# Patient Record
Sex: MALE | Race: WHITE | NOT HISPANIC OR LATINO | Employment: FULL TIME | URBAN - METROPOLITAN AREA
[De-identification: names, ages, dates, MRNs, and addresses within clinical notes are randomized per-mention and may not be internally consistent; named-entity substitution may affect disease eponyms.]

---

## 2019-08-12 ENCOUNTER — OFFICE VISIT (OUTPATIENT)
Dept: FAMILY MEDICINE | Facility: CLINIC | Age: 64
End: 2019-08-12
Payer: COMMERCIAL

## 2019-08-12 VITALS
DIASTOLIC BLOOD PRESSURE: 86 MMHG | WEIGHT: 226 LBS | OXYGEN SATURATION: 96 % | BODY MASS INDEX: 32.35 KG/M2 | SYSTOLIC BLOOD PRESSURE: 134 MMHG | HEART RATE: 65 BPM | HEIGHT: 70 IN | TEMPERATURE: 98 F | RESPIRATION RATE: 20 BRPM

## 2019-08-12 DIAGNOSIS — Z91.89 AT RISK FOR CORONARY ARTERY DISEASE: ICD-10-CM

## 2019-08-12 DIAGNOSIS — Z12.11 SCREENING FOR MALIGNANT NEOPLASM OF COLON: ICD-10-CM

## 2019-08-12 DIAGNOSIS — Z12.5 SCREENING FOR MALIGNANT NEOPLASM OF PROSTATE: ICD-10-CM

## 2019-08-12 DIAGNOSIS — F10.10 ETOH ABUSE: ICD-10-CM

## 2019-08-12 DIAGNOSIS — Z82.49 FAMILY HISTORY OF CORONARY ARTERIOSCLEROSIS: ICD-10-CM

## 2019-08-12 DIAGNOSIS — Z00.00 WELLNESS EXAMINATION: Primary | ICD-10-CM

## 2019-08-12 DIAGNOSIS — F17.200 TOBACCO DEPENDENCE: ICD-10-CM

## 2019-08-12 PROCEDURE — 99386 PR PREVENTIVE VISIT,NEW,40-64: ICD-10-PCS | Mod: 25,S$GLB,, | Performed by: FAMILY MEDICINE

## 2019-08-12 PROCEDURE — 93000 ELECTROCARDIOGRAM COMPLETE: CPT | Mod: S$GLB,,, | Performed by: FAMILY MEDICINE

## 2019-08-12 PROCEDURE — 99386 PREV VISIT NEW AGE 40-64: CPT | Mod: 25,S$GLB,, | Performed by: FAMILY MEDICINE

## 2019-08-12 PROCEDURE — 93000 PR ELECTROCARDIOGRAM, COMPLETE: ICD-10-PCS | Mod: S$GLB,,, | Performed by: FAMILY MEDICINE

## 2019-08-12 NOTE — PROGRESS NOTES
Subjective:      Patient ID: Ousmane Vega is a 64 y.o. male.    Chief Complaint: Establish Care (strong family hx of heart problems )      HPI:  64-year-old white male past medical history of knee osteoarthritis who presents for initiation care.  He is without complaints today.  He denies any shortness of breath or chest pain. He does have some concern as he has a strong family history of coronary artery disease.  His father mother and multiple siblings with coronary artery disease in their 60s.  He is active.  He can walk up 2 flights of stairs without chest pain. He does use tobacco once a day.  He does drink beer on a daily basis.  Normally drinks more than 2 beers.  No family history of colon or prostate cancer.    Past Medical History:   Diagnosis Date    Arthritis      Past Surgical History:   Procedure Laterality Date    HAND SURGERY      LEG SURGERY      plate to right lower leg from fx      Family History   Problem Relation Age of Onset    Heart disease Mother     Hyperlipidemia Mother     Hypertension Mother     Heart disease Father     Hyperlipidemia Father     Hypertension Father     Hyperlipidemia Sister     Heart attack Brother     Hypertension Brother     Heart attack Maternal Grandfather     Alzheimer's disease Paternal Grandfather      Social History     Socioeconomic History    Marital status:      Spouse name: Not on file    Number of children: Not on file    Years of education: Not on file    Highest education level: Not on file   Occupational History    Not on file   Social Needs    Financial resource strain: Not on file    Food insecurity:     Worry: Not on file     Inability: Not on file    Transportation needs:     Medical: Not on file     Non-medical: Not on file   Tobacco Use    Smoking status: Never Smoker    Smokeless tobacco: Current User     Types: Snuff   Substance and Sexual Activity    Alcohol use: Yes     Alcohol/week: 2.4 oz     Types: 4 Cans of  beer per week     Frequency: 2-3 times a week    Drug use: Never    Sexual activity: Not on file   Lifestyle    Physical activity:     Days per week: Not on file     Minutes per session: Not on file    Stress: Not on file   Relationships    Social connections:     Talks on phone: Not on file     Gets together: Not on file     Attends Anabaptist service: Not on file     Active member of club or organization: Not on file     Attends meetings of clubs or organizations: Not on file     Relationship status: Not on file   Other Topics Concern    Not on file   Social History Narrative    Not on file     Review of patient's allergies indicates:  No Known Allergies    Review of Systems   Constitutional: Negative for activity change, appetite change, chills, fatigue, fever and unexpected weight change.   HENT: Negative for congestion, ear pain, rhinorrhea, sinus pressure, sinus pain, sneezing, sore throat and trouble swallowing.    Eyes: Negative for photophobia, pain and itching.   Respiratory: Negative for cough, chest tightness, shortness of breath and wheezing.    Cardiovascular: Negative for chest pain, palpitations and leg swelling.   Gastrointestinal: Negative for abdominal distention, abdominal pain, constipation, diarrhea, nausea and vomiting.   Endocrine: Negative for cold intolerance, heat intolerance, polydipsia and polyphagia.   Genitourinary: Negative for difficulty urinating, dysuria and frequency.   Musculoskeletal: Negative for arthralgias, joint swelling and myalgias.   Skin: Negative for pallor and rash.   Neurological: Negative for dizziness, seizures, syncope, speech difficulty and headaches.   Hematological: Negative for adenopathy. Does not bruise/bleed easily.   Psychiatric/Behavioral: Negative for agitation, behavioral problems and hallucinations.       Objective:       /86 (BP Location: Left arm, Patient Position: Sitting, BP Method: Large (Manual))   Pulse 65   Temp 97.7 °F (36.5 °C)  "(Oral)   Resp 20   Ht 5' 10" (1.778 m)   Wt 102.5 kg (226 lb)   SpO2 96%   BMI 32.43 kg/m²   Physical Exam   Constitutional: He is oriented to person, place, and time. He appears well-developed and well-nourished.   HENT:   Head: Normocephalic and atraumatic.   Nose: Nose normal.   Mouth/Throat: Oropharynx is clear and moist.   Eyes: Pupils are equal, round, and reactive to light. Conjunctivae and EOM are normal.   Neck: Normal range of motion. Neck supple.   Cardiovascular: Normal rate, regular rhythm, normal heart sounds and intact distal pulses.   Pulmonary/Chest: Effort normal and breath sounds normal.   Abdominal: Soft. There is no tenderness.   Musculoskeletal: Normal range of motion.   Neurological: He is alert and oriented to person, place, and time.   Skin: Skin is warm and dry.   Psychiatric: He has a normal mood and affect. His behavior is normal. Judgment and thought content normal.   Nursing note and vitals reviewed.      Assessment:     1. Wellness examination    2. Tobacco dependence    3. ETOH abuse    4. Family history of coronary arteriosclerosis    5. Screening for malignant neoplasm of prostate    6. Screening for malignant neoplasm of colon    7. At risk for coronary artery disease        Plan:   Wellness examination  -     CBC auto differential; Future; Expected date: 08/12/2019  -     Comprehensive metabolic panel; Future; Expected date: 08/12/2019  -     Lipid panel; Future; Expected date: 08/12/2019    Tobacco dependence    ETOH abuse    Family history of coronary arteriosclerosis  -     EKG 12-lead    Screening for malignant neoplasm of prostate  -     PSA, Screening; Future; Expected date: 08/12/2019    Screening for malignant neoplasm of colon    At risk for coronary artery disease  -     EKG 12-lead  -     CT Calcium Scoring Cardiac; Future; Expected date: 08/12/2019      Discussed colon cancer screening with patient again on follow-up phone call.    Order CT calcium score.  Patient " is asymptomatic but has moderate risk.  Safe ethanol usage and avoidance of tobacco discussed.  Diet and exercise.    PSA ordered.    Will come in for fasting labs.

## 2019-08-29 ENCOUNTER — CLINICAL SUPPORT (OUTPATIENT)
Dept: FAMILY MEDICINE | Facility: CLINIC | Age: 64
End: 2019-08-29
Payer: COMMERCIAL

## 2019-08-29 DIAGNOSIS — R93.1 AGATSTON CORONARY ARTERY CALCIUM SCORE GREATER THAN 400: Primary | ICD-10-CM

## 2019-08-29 DIAGNOSIS — K57.92 DIVERTICULITIS: ICD-10-CM

## 2019-08-29 NOTE — PROGRESS NOTES
Discussed Calcium score.     Order PET scan.    Recently had diverticulitis.  Started on antibiotics by ER.  Will need colonoscopy.  Will order this after PET scan obtained.  Denies any chest pain shortness of breath.    Plan to start cholesterol medicine once labs results.

## 2019-08-30 DIAGNOSIS — R73.9 HYPERGLYCEMIA: Primary | ICD-10-CM

## 2019-08-30 LAB
ABS NRBC COUNT: 0 X 10 3/UL (ref 0–0.01)
ABSOLUTE BASOPHIL: 0.08 X 10 3/UL (ref 0–0.22)
ABSOLUTE EOSINOPHIL: 0.24 X 10 3/UL (ref 0.04–0.54)
ABSOLUTE IMMATURE GRAN: 0.02 X 10 3/UL (ref 0–0.04)
ABSOLUTE LYMPHOCYTE: 2.13 X 10 3/UL (ref 0.86–4.75)
ABSOLUTE MONOCYTE: 0.51 X 10 3/UL (ref 0.22–1.08)
ALBUMIN SERPL-MCNC: 4.3 G/DL (ref 3.5–5.2)
ALBUMIN/GLOB SERPL ELPH: 1.7 {RATIO} (ref 1–2.7)
ALP ISOS SERPL LEV INH-CCNC: 39 IU/L (ref 40–130)
ALT (SGPT): 50 U/L (ref 0–41)
ANION GAP SERPL CALC-SCNC: 14 MMOL/L (ref 8–17)
AST SERPL-CCNC: 27 U/L (ref 0–40)
BASOPHILS NFR BLD: 1.1 %
BILIRUBIN, TOTAL: 0.45 MG/DL (ref 0–1.2)
BUN/CREAT SERPL: 16.2 (ref 6–20)
CALCIUM SERPL-MCNC: 9.6 MG/DL (ref 8.6–10.2)
CARBON DIOXIDE, CO2: 23 MMOL/L (ref 22–29)
CHLORIDE: 107 MMOL/L (ref 98–107)
CHOLEST SERPL-MSCNC: 232 MG/DL (ref 100–200)
CREAT SERPL-MCNC: 0.77 MG/DL (ref 0.7–1.2)
EOSINOPHIL NFR BLD: 3.4 %
GFR ESTIMATION: 102.37
GLOBULIN: 2.6 G/DL (ref 1.5–4.5)
GLUCOSE: 164 MG/DL (ref 82–115)
HCT VFR BLD AUTO: 46.5 % (ref 42–52)
HDLC SERPL-MCNC: 50 MG/DL
HGB BLD-MCNC: 16.1 G/DL (ref 14–18)
IMMATURE GRANULOCYTES: 0.3 % (ref 0–0.5)
LDL/HDL RATIO: ABNORMAL
LDLC SERPL CALC-MCNC: ABNORMAL MG/DL
LYMPHOCYTES NFR BLD: 30.6 %
MCH RBC QN AUTO: 32.4 PG (ref 27–32)
MCHC RBC AUTO-ENTMCNC: 34.6 G/DL (ref 32–36)
MCV RBC AUTO: 93.6 FL (ref 80–94)
MONOCYTES NFR BLD: 7.3 %
NEUTROPHILS ABSOLUTE COUNT: 3.98 X 10 3/UL (ref 2.15–7.56)
NEUTROPHILS NFR BLD: 57.3 %
NUCLEATED RED BLOOD CELLS: 0 /100 WBC (ref 0–0.2)
PLATELET # BLD AUTO: 238 X 10 3/UL (ref 135–400)
POTASSIUM: 4.4 MMOL/L (ref 3.5–5.1)
PROT SNV-MCNC: 6.9 G/DL (ref 6.4–8.3)
PSA, DIAGNOSTIC: 0.39 NG/ML (ref 0–4)
RBC # BLD AUTO: 4.97 X 10 6/UL (ref 4.7–6.1)
RDW-SD: 43.7 FL (ref 37–54)
SODIUM: 144 MMOL/L (ref 136–145)
TRIGL SERPL-MCNC: 436 MG/DL (ref 0–150)
UREA NITROGEN (BUN): 12.5 MG/DL (ref 8–23)
WBC # BLD: 6.96 X 10 3/UL (ref 4.3–10.8)

## 2019-08-31 LAB
ESTIMATED AVERAGE GLUCOSE: 155 MG/DL
HBA1C MFR BLD: 7 % (ref 4–6)

## 2019-09-13 ENCOUNTER — OFFICE VISIT (OUTPATIENT)
Dept: FAMILY MEDICINE | Facility: CLINIC | Age: 64
End: 2019-09-13
Payer: COMMERCIAL

## 2019-09-13 VITALS
SYSTOLIC BLOOD PRESSURE: 124 MMHG | BODY MASS INDEX: 32.21 KG/M2 | HEIGHT: 70 IN | WEIGHT: 225 LBS | HEART RATE: 67 BPM | DIASTOLIC BLOOD PRESSURE: 78 MMHG | RESPIRATION RATE: 18 BRPM | OXYGEN SATURATION: 98 % | TEMPERATURE: 98 F

## 2019-09-13 DIAGNOSIS — E78.2 MIXED HYPERLIPIDEMIA: ICD-10-CM

## 2019-09-13 DIAGNOSIS — R93.1 AGATSTON CORONARY ARTERY CALCIUM SCORE GREATER THAN 400: ICD-10-CM

## 2019-09-13 DIAGNOSIS — E11.9 TYPE 2 DIABETES MELLITUS WITHOUT COMPLICATION, WITHOUT LONG-TERM CURRENT USE OF INSULIN: Primary | ICD-10-CM

## 2019-09-13 PROCEDURE — 99214 OFFICE O/P EST MOD 30 MIN: CPT | Mod: S$GLB,,, | Performed by: FAMILY MEDICINE

## 2019-09-13 PROCEDURE — 99214 PR OFFICE/OUTPT VISIT, EST, LEVL IV, 30-39 MIN: ICD-10-PCS | Mod: S$GLB,,, | Performed by: FAMILY MEDICINE

## 2019-09-13 RX ORDER — ATORVASTATIN CALCIUM 40 MG/1
40 TABLET, FILM COATED ORAL NIGHTLY
Qty: 30 TABLET | Refills: 2 | Status: SHIPPED | OUTPATIENT
Start: 2019-09-13 | End: 2019-12-17 | Stop reason: SDUPTHER

## 2019-09-13 RX ORDER — METFORMIN HYDROCHLORIDE 500 MG/1
500 TABLET, EXTENDED RELEASE ORAL
Qty: 30 TABLET | Refills: 2 | Status: SHIPPED | OUTPATIENT
Start: 2019-09-13 | End: 2019-12-17 | Stop reason: SDUPTHER

## 2019-09-13 NOTE — PROGRESS NOTES
Subjective:      Patient ID: Ousmane Vega is a 64 y.o. male.    Chief Complaint: Follow-up      HPI:  64-year-old white male past history of CAD by calcium score and diverticulitis who presents for diabetes and hyperlipidemia.  Reports urinating only around once at night on occasion.  Does not urinate often throughout the day.  Reports eating fairly healthy.  Has not yet heard anything about his PET scan.  He feels well.  He needs to go to work and is very busy.      Past Medical History:   Diagnosis Date    Arthritis      Past Surgical History:   Procedure Laterality Date    HAND SURGERY      LEG SURGERY      plate to right lower leg from fx      Family History   Problem Relation Age of Onset    Heart disease Mother     Hyperlipidemia Mother     Hypertension Mother     Heart disease Father     Hyperlipidemia Father     Hypertension Father     Hyperlipidemia Sister     Heart attack Brother     Hypertension Brother     Heart attack Maternal Grandfather     Alzheimer's disease Paternal Grandfather      Social History     Socioeconomic History    Marital status:      Spouse name: Not on file    Number of children: Not on file    Years of education: Not on file    Highest education level: Not on file   Occupational History    Not on file   Social Needs    Financial resource strain: Not on file    Food insecurity:     Worry: Not on file     Inability: Not on file    Transportation needs:     Medical: Not on file     Non-medical: Not on file   Tobacco Use    Smoking status: Never Smoker    Smokeless tobacco: Current User     Types: Snuff   Substance and Sexual Activity    Alcohol use: Yes     Alcohol/week: 2.4 oz     Types: 4 Cans of beer per week     Frequency: 2-3 times a week    Drug use: Never    Sexual activity: Not on file   Lifestyle    Physical activity:     Days per week: Not on file     Minutes per session: Not on file    Stress: Not on file   Relationships    Social  "connections:     Talks on phone: Not on file     Gets together: Not on file     Attends Islam service: Not on file     Active member of club or organization: Not on file     Attends meetings of clubs or organizations: Not on file     Relationship status: Not on file   Other Topics Concern    Not on file   Social History Narrative    Not on file     Review of patient's allergies indicates:  No Known Allergies    Review of Systems   Constitutional: Negative for activity change, appetite change, chills, fatigue, fever and unexpected weight change.   HENT: Negative for congestion, ear pain, rhinorrhea, sinus pressure, sinus pain, sneezing, sore throat and trouble swallowing.    Eyes: Negative for photophobia, pain and itching.   Respiratory: Negative for cough, chest tightness, shortness of breath and wheezing.    Cardiovascular: Negative for chest pain, palpitations and leg swelling.   Gastrointestinal: Negative for abdominal distention, abdominal pain, constipation, diarrhea, nausea and vomiting.   Endocrine: Negative for cold intolerance, heat intolerance, polydipsia and polyphagia.   Genitourinary: Negative for difficulty urinating, dysuria and frequency.   Musculoskeletal: Negative for arthralgias, joint swelling and myalgias.   Skin: Negative for pallor and rash.   Neurological: Negative for dizziness, seizures, syncope, speech difficulty and headaches.   Hematological: Negative for adenopathy. Does not bruise/bleed easily.   Psychiatric/Behavioral: Negative for agitation, behavioral problems and hallucinations.       Objective:       /78 (BP Location: Left arm, Patient Position: Sitting, BP Method: Large (Manual))   Pulse 67   Temp 98.4 °F (36.9 °C) (Oral)   Resp 18   Ht 5' 10" (1.778 m)   Wt 102.1 kg (225 lb)   SpO2 98%   BMI 32.28 kg/m²   Physical Exam   Constitutional: He is oriented to person, place, and time. He appears well-developed and well-nourished.   HENT:   Head: Normocephalic and " atraumatic.   Nose: Nose normal.   Mouth/Throat: Oropharynx is clear and moist.   Eyes: Pupils are equal, round, and reactive to light. Conjunctivae and EOM are normal.   Neck: Normal range of motion. Neck supple.   Cardiovascular: Normal rate, regular rhythm, normal heart sounds and intact distal pulses.   Pulmonary/Chest: Effort normal and breath sounds normal.   Abdominal: Soft. There is no tenderness.   Musculoskeletal: Normal range of motion.   Neurological: He is alert and oriented to person, place, and time.   Skin: Skin is warm and dry.   Psychiatric: He has a normal mood and affect. His behavior is normal. Judgment and thought content normal.   Nursing note and vitals reviewed.      Assessment:     1. Type 2 diabetes mellitus without complication, without long-term current use of insulin    2. Mixed hyperlipidemia    3. Agatston coronary artery calcium score greater than 400        Plan:   Type 2 diabetes mellitus without complication, without long-term current use of insulin    Mixed hyperlipidemia    Agatston coronary artery calcium score greater than 400    Other orders  -     metFORMIN (GLUCOPHAGE-XR) 500 MG 24 hr tablet; Take 1 tablet (500 mg total) by mouth daily with breakfast.  Dispense: 30 tablet; Refill: 2  -     atorvastatin (LIPITOR) 40 MG tablet; Take 1 tablet (40 mg total) by mouth every evening.  Dispense: 30 tablet; Refill: 2      Start metformin.  Side effects discussed.  Diet and exercise discussed.  Low carb diet.  Cut back on ethanol use.    Start Lipitor.  Call if not tolerating.    Will call and refax PET scan order.        Medication List with Changes/Refills   New Medications    ATORVASTATIN (LIPITOR) 40 MG TABLET    Take 1 tablet (40 mg total) by mouth every evening.    METFORMIN (GLUCOPHAGE-XR) 500 MG 24 HR TABLET    Take 1 tablet (500 mg total) by mouth daily with breakfast.

## 2019-09-27 DIAGNOSIS — R93.1 AGATSTON CORONARY ARTERY CALCIUM SCORE GREATER THAN 400: Primary | ICD-10-CM

## 2019-09-30 ENCOUNTER — TELEPHONE (OUTPATIENT)
Dept: FAMILY MEDICINE | Facility: CLINIC | Age: 64
End: 2019-09-30

## 2019-09-30 NOTE — TELEPHONE ENCOUNTER
----- Message from Jese Morales MD sent at 9/27/2019 11:40 AM CDT -----  Contact: Alicia (Dr. Lawton's Office)  Please let patient know that we did not forget about test that insurnace denied initial test so we are going to have to order a different test.      ----- Message -----  From: Tracey Ley LPN  Sent: 9/26/2019   2:07 PM CDT  To: Jese Morales MD    Called and stated that patient do not met criteria for cardiac Pet scan due to his insurance. Patient do quantify for a nuclear stress test with clinic notes stating abnormal EKG or CAD. Their nuclear stress machine is down for a few weeks and they are referring patient to [facility]. Which is on the 2nd floor near surgery

## 2019-09-30 NOTE — TELEPHONE ENCOUNTER
Called patient and informed that he is going to get a nuclear stress test. Also informed patient if not hear from them by Wednesday to call the clinic back. Patient stated understanding.

## 2019-10-08 DIAGNOSIS — K57.92 DIVERTICULITIS: Primary | ICD-10-CM

## 2019-10-08 DIAGNOSIS — Z12.11 SCREENING FOR MALIGNANT NEOPLASM OF COLON: ICD-10-CM

## 2019-10-15 ENCOUNTER — OFFICE VISIT (OUTPATIENT)
Dept: FAMILY MEDICINE | Facility: CLINIC | Age: 64
End: 2019-10-15
Payer: COMMERCIAL

## 2019-10-15 VITALS
WEIGHT: 216 LBS | OXYGEN SATURATION: 98 % | SYSTOLIC BLOOD PRESSURE: 140 MMHG | RESPIRATION RATE: 18 BRPM | HEART RATE: 78 BPM | TEMPERATURE: 98 F | DIASTOLIC BLOOD PRESSURE: 90 MMHG | HEIGHT: 70 IN | BODY MASS INDEX: 30.92 KG/M2

## 2019-10-15 DIAGNOSIS — N21.9 CALCULUS OF LOWER URINARY TRACT: ICD-10-CM

## 2019-10-15 DIAGNOSIS — N41.0 ACUTE PROSTATITIS: Primary | ICD-10-CM

## 2019-10-15 DIAGNOSIS — E11.9 TYPE 2 DIABETES MELLITUS WITHOUT COMPLICATION, WITHOUT LONG-TERM CURRENT USE OF INSULIN: ICD-10-CM

## 2019-10-15 LAB
BILIRUB SERPL-MCNC: NORMAL MG/DL
BLOOD, POC UA: NORMAL
GLUCOSE SERPL-MCNC: 141 MG/DL (ref 70–110)
GLUCOSE UR QL STRIP: NORMAL
KETONES UR QL STRIP: NORMAL
LEUKOCYTE ESTERASE URINE, POC: NORMAL
NITRITE, POC UA: NORMAL
PH, POC UA: 5
PROTEIN, POC: 30
SPECIFIC GRAVITY, POC UA: 1.02
UROBILINOGEN, POC UA: 0.2

## 2019-10-15 PROCEDURE — 81003 URINALYSIS AUTO W/O SCOPE: CPT | Mod: QW,S$GLB,, | Performed by: FAMILY MEDICINE

## 2019-10-15 PROCEDURE — 99214 OFFICE O/P EST MOD 30 MIN: CPT | Mod: 25,S$GLB,, | Performed by: FAMILY MEDICINE

## 2019-10-15 PROCEDURE — 81003 POCT URINALYSIS: ICD-10-PCS | Mod: QW,S$GLB,, | Performed by: FAMILY MEDICINE

## 2019-10-15 PROCEDURE — 99214 PR OFFICE/OUTPT VISIT, EST, LEVL IV, 30-39 MIN: ICD-10-PCS | Mod: 25,S$GLB,, | Performed by: FAMILY MEDICINE

## 2019-10-15 PROCEDURE — 82962 POCT GLUCOSE, HAND-HELD DEVICE: ICD-10-PCS | Mod: ,,, | Performed by: FAMILY MEDICINE

## 2019-10-15 PROCEDURE — 82962 GLUCOSE BLOOD TEST: CPT | Mod: ,,, | Performed by: FAMILY MEDICINE

## 2019-10-15 RX ORDER — TAMSULOSIN HYDROCHLORIDE 0.4 MG/1
0.4 CAPSULE ORAL DAILY
Qty: 30 CAPSULE | Refills: 1 | Status: SHIPPED | OUTPATIENT
Start: 2019-10-15 | End: 2019-12-17

## 2019-10-15 RX ORDER — HYDROCODONE BITARTRATE AND ACETAMINOPHEN 10; 325 MG/1; MG/1
1 TABLET ORAL EVERY 6 HOURS PRN
Qty: 20 TABLET | Refills: 0 | Status: SHIPPED | OUTPATIENT
Start: 2019-10-15 | End: 2019-12-17

## 2019-10-15 RX ORDER — SULFAMETHOXAZOLE AND TRIMETHOPRIM 800; 160 MG/1; MG/1
1 TABLET ORAL 2 TIMES DAILY
Qty: 28 TABLET | Refills: 0 | Status: SHIPPED | OUTPATIENT
Start: 2019-10-15 | End: 2019-12-17

## 2019-10-15 NOTE — PROGRESS NOTES
Subjective:      Patient ID: Oumsane Vega is a 64 y.o. male.    Chief Complaint: Urinary Urgency (last night and this morning/fever last night )      HPI:  64-year-old white male past history of diabetes, diverticulitis and hyperlipidemia who presents with urinary frequency and chills.  Began acutely last night.  Has had multiple episodes of urinary frequency.  No fever just chills and diaphoresis.  No nausea vomiting.  Does not feel like the diverticulitis he had 2 months ago.  Denies any hematuria.  Has some burning at the tip of the penis.    Past Medical History:   Diagnosis Date    Arthritis      Past Surgical History:   Procedure Laterality Date    HAND SURGERY      LEG SURGERY      plate to right lower leg from fx      Family History   Problem Relation Age of Onset    Heart disease Mother     Hyperlipidemia Mother     Hypertension Mother     Heart disease Father     Hyperlipidemia Father     Hypertension Father     Hyperlipidemia Sister     Heart attack Brother     Hypertension Brother     Heart attack Maternal Grandfather     Alzheimer's disease Paternal Grandfather      Social History     Socioeconomic History    Marital status:      Spouse name: Not on file    Number of children: Not on file    Years of education: Not on file    Highest education level: Not on file   Occupational History    Not on file   Social Needs    Financial resource strain: Not on file    Food insecurity:     Worry: Not on file     Inability: Not on file    Transportation needs:     Medical: Not on file     Non-medical: Not on file   Tobacco Use    Smoking status: Never Smoker    Smokeless tobacco: Current User     Types: Snuff   Substance and Sexual Activity    Alcohol use: Yes     Alcohol/week: 4.0 standard drinks     Types: 4 Cans of beer per week     Frequency: 2-3 times a week    Drug use: Never    Sexual activity: Not on file   Lifestyle    Physical activity:     Days per week: Not on file  "    Minutes per session: Not on file    Stress: Not on file   Relationships    Social connections:     Talks on phone: Not on file     Gets together: Not on file     Attends Synagogue service: Not on file     Active member of club or organization: Not on file     Attends meetings of clubs or organizations: Not on file     Relationship status: Not on file   Other Topics Concern    Not on file   Social History Narrative    Not on file     Review of patient's allergies indicates:  No Known Allergies    Review of Systems   Constitutional: Positive for chills. Negative for activity change, appetite change, fatigue, fever and unexpected weight change.   HENT: Negative for congestion, ear pain, rhinorrhea, sinus pressure, sinus pain, sneezing, sore throat and trouble swallowing.    Eyes: Negative for photophobia, pain and itching.   Respiratory: Negative for cough, chest tightness, shortness of breath and wheezing.    Cardiovascular: Negative for chest pain, palpitations and leg swelling.   Gastrointestinal: Negative for abdominal distention, abdominal pain, constipation, diarrhea, nausea and vomiting.   Endocrine: Negative for cold intolerance, heat intolerance, polydipsia and polyphagia.   Genitourinary: Positive for frequency. Negative for difficulty urinating and dysuria.   Musculoskeletal: Negative for arthralgias, joint swelling and myalgias.   Skin: Negative for pallor and rash.   Neurological: Negative for dizziness, seizures, syncope, speech difficulty and headaches.   Hematological: Negative for adenopathy. Does not bruise/bleed easily.   Psychiatric/Behavioral: Negative for agitation, behavioral problems and hallucinations.       Objective:       BP (!) 140/90 (BP Location: Left arm, Patient Position: Sitting, BP Method: Large (Manual))   Pulse 78   Temp 98.4 °F (36.9 °C) (Oral)   Resp 18   Ht 5' 10" (1.778 m)   Wt 98 kg (216 lb)   SpO2 98%   BMI 30.99 kg/m²   Physical Exam   Constitutional: He is " oriented to person, place, and time. He appears well-developed and well-nourished.   HENT:   Head: Normocephalic and atraumatic.   Nose: Nose normal.   Mouth/Throat: Oropharynx is clear and moist.   Eyes: Pupils are equal, round, and reactive to light. Conjunctivae and EOM are normal.   Neck: Normal range of motion. Neck supple.   Cardiovascular: Normal rate, regular rhythm, normal heart sounds and intact distal pulses.   Pulmonary/Chest: Effort normal and breath sounds normal.   Abdominal: Soft. He exhibits no distension and no mass. There is no tenderness. There is no rebound and no guarding.   Musculoskeletal: Normal range of motion.   Neurological: He is alert and oriented to person, place, and time.   Skin: Skin is warm and dry.   Psychiatric: He has a normal mood and affect. His behavior is normal. Judgment and thought content normal.   Nursing note and vitals reviewed.      Assessment:     1. Acute prostatitis    2. Calculus of lower urinary tract    3. Type 2 diabetes mellitus without complication, without long-term current use of insulin        Plan:   Acute prostatitis  -     POCT Urinalysis  -     Urine culture  -     sulfamethoxazole-trimethoprim 800-160mg (BACTRIM DS) 800-160 mg Tab; Take 1 tablet by mouth 2 (two) times daily.  Dispense: 28 tablet; Refill: 0    Calculus of lower urinary tract  -     tamsulosin (FLOMAX) 0.4 mg Cap; Take 1 capsule (0.4 mg total) by mouth once daily.  Dispense: 30 capsule; Refill: 1  -     HYDROcodone-acetaminophen (NORCO)  mg per tablet; Take 1 tablet by mouth every 6 (six) hours as needed for Pain.  Dispense: 20 tablet; Refill: 0  -     Ambulatory Referral to Urology    Type 2 diabetes mellitus without complication, without long-term current use of insulin  -     POCT Glucose, Hand-Held Device; Future; Expected date: 10/15/2019      UA with large blood.  No leuk esterase or nitrites.  Send for culture.  Start Bactrim.  If culture positive will plan to treat for  prostatitis.    CT reviewed from ER.  Noted to have 6 mm x 10 mm bladder stone with some nonobstructive left renal stones.  I suspect the symptoms may be related to the 10 mm bladder stone causing obstructive symptoms.    Refer to Urology for further evaluation.    Medication List with Changes/Refills   New Medications    HYDROCODONE-ACETAMINOPHEN (NORCO)  MG PER TABLET    Take 1 tablet by mouth every 6 (six) hours as needed for Pain.    SULFAMETHOXAZOLE-TRIMETHOPRIM 800-160MG (BACTRIM DS) 800-160 MG TAB    Take 1 tablet by mouth 2 (two) times daily.    TAMSULOSIN (FLOMAX) 0.4 MG CAP    Take 1 capsule (0.4 mg total) by mouth once daily.   Current Medications    ATORVASTATIN (LIPITOR) 40 MG TABLET    Take 1 tablet (40 mg total) by mouth every evening.    METFORMIN (GLUCOPHAGE-XR) 500 MG 24 HR TABLET    Take 1 tablet (500 mg total) by mouth daily with breakfast.

## 2019-10-17 LAB — URINE CULTURE, ROUTINE: NORMAL

## 2019-10-23 ENCOUNTER — OFFICE VISIT (OUTPATIENT)
Dept: UROLOGY | Facility: CLINIC | Age: 64
End: 2019-10-23
Payer: COMMERCIAL

## 2019-10-23 VITALS — HEART RATE: 61 BPM | DIASTOLIC BLOOD PRESSURE: 78 MMHG | SYSTOLIC BLOOD PRESSURE: 110 MMHG

## 2019-10-23 DIAGNOSIS — N21.9 CALCULUS OF LOWER URINARY TRACT: Primary | ICD-10-CM

## 2019-10-23 DIAGNOSIS — R31.29 MICROSCOPIC HEMATURIA: ICD-10-CM

## 2019-10-23 DIAGNOSIS — R39.11 URINARY HESITANCY: ICD-10-CM

## 2019-10-23 LAB
BILIRUB UR QL STRIP: NEGATIVE
GLUCOSE UR QL STRIP: NEGATIVE
KETONES UR QL STRIP: NEGATIVE
LEUKOCYTE ESTERASE UR QL STRIP: NEGATIVE
PH, POC UA: 5.5
POC AMORP, URINE: 0
POC BACTI, URINE: 0
POC BLOOD, URINE: POSITIVE
POC CASTS, URINE: 0
POC CRYST, URINE: 0
POC EPITH, URINE: 0
POC HCG, URINE: ABNORMAL
POC HYALIN, URINE: 0 LPF
POC MUCUS, URINE: 0
POC NITRATES, URINE: NEGATIVE
POC OTHER, URINE: 0
POC RBC, URINE: ABNORMAL HPF
POC WBC, URINE: ABNORMAL HPF
PROT UR QL STRIP: NEGATIVE
SP GR UR STRIP: 1.02 (ref 1–1.03)
UROBILINOGEN UR STRIP-ACNC: 0.2 (ref 0.3–2.2)

## 2019-10-23 PROCEDURE — 99204 PR OFFICE/OUTPT VISIT, NEW, LEVL IV, 45-59 MIN: ICD-10-PCS | Mod: 25,S$GLB,, | Performed by: NURSE PRACTITIONER

## 2019-10-23 PROCEDURE — 36415 COLL VENOUS BLD VENIPUNCTURE: CPT | Mod: S$GLB,,, | Performed by: NURSE PRACTITIONER

## 2019-10-23 PROCEDURE — 99204 OFFICE O/P NEW MOD 45 MIN: CPT | Mod: 25,S$GLB,, | Performed by: NURSE PRACTITIONER

## 2019-10-23 PROCEDURE — 81001 PR  URINALYSIS, AUTO, W/SCOPE: ICD-10-PCS | Mod: S$GLB,,, | Performed by: NURSE PRACTITIONER

## 2019-10-23 PROCEDURE — 36415 PR COLLECTION VENOUS BLOOD,VENIPUNCTURE: ICD-10-PCS | Mod: S$GLB,,, | Performed by: NURSE PRACTITIONER

## 2019-10-23 PROCEDURE — 81001 URINALYSIS AUTO W/SCOPE: CPT | Mod: S$GLB,,, | Performed by: NURSE PRACTITIONER

## 2019-10-23 NOTE — PROGRESS NOTES
Chief Complaint: dysuria    HPI: 64-year-old  male referred by his PCP Dr. Luis Morales for calculus of lower urinary tract.  Patient initially presented to [facility] ER on 08/14/2019 with complaints of left lower quadrant abdominal pain without fever, CT abdomen and pelvis without contrast performed revealing acute diverticulitis involving the distal descending colon, as well as 6 x 10 mm calculus in the right posterior urinary bladder.  No ureteral calculi or evidence of urinary tract obstruction noted.  Patient followed up with his PCP on 10/15/2019, was given Bactrim DS, Flomax, and Norco p.r.n. pain.  PCP referred to us as it is he wanted to ensure that the 10 mm bladder stone was not causing any obstructive symptoms.  Patient states that the pain has resolved, he began experiencing inability to complete voiding this morning.  He states that the urine flow just abruptly stopped during urination, causing concern.  Patient denies any gross hematuria, burning with urination, or experiencing these symptoms in the past.  He denies a history of kidney stones, but does report a family history of stones.  He states that his PCP checked his prostate.  He denies any other urological complaints.    Allergies:  Patient has no known allergies.    Medications:  has a current medication list which includes the following prescription(s): atorvastatin, hydrocodone-acetaminophen, metformin, sulfamethoxazole-trimethoprim 800-160mg, and tamsulosin.    Review of Systems:  General: No fever, chills, vision changes, dizziness, weakness, fatigue, unexplained weight loss, confusion, or mood swings.  Skin: No rashes, itching, or changes in color/texture of skin.  Chest: Denies YOUNG, cyanosis, wheezing, cough, and sputum production.  Abdomen: Appetite fine. Denies diarrhea, abdominal pain, hematemesis, or blood in stool.  Musculoskeletal: No joint stiffness or swelling. Denies back pain.  : As above.  All other review  of systems negative.    PMH:   has a past medical history of Arthritis.    PSH:   has a past surgical history that includes Leg Surgery and Hand surgery.    FamHx: family history includes Alzheimer's disease in his paternal grandfather; Heart attack in his brother and maternal grandfather; Heart disease in his father and mother; Hyperlipidemia in his father, mother, and sister; Hypertension in his brother, father, and mother.    SocHx:  reports that he has never smoked. His smokeless tobacco use includes snuff. He reports that he drinks about 4.0 standard drinks of alcohol per week. He reports that he does not use drugs.      Physical Exam:  Vitals:    10/23/19 1547   BP: 110/78   Pulse: 61     General: AAOx3, no apparent distress, no deformities  Neck: supple, no masses, normal thyroid  Lungs: normal inspiration, no use of accessory muscles  Heart: regular rate and rhythm, no arrhythmias  Abdomen: soft, NT, ND, no masses, no hernias, no hepatosplenomegaly  Lymphatic: no unusually enlarged or tender lymph nodes  Skin: warm and dry, no jaundice.  Ext: without edema or deformity.  : deferred    Labs/Studies: UA reveals RBCs 5-7/hpf, otherwise negative    Impression/Plan:   Nephrolithiasis  Comments:  CT from ER visit reviewed and case discussed with Dr. García, will await BMP results and order CT urogram for further study, continue PO analgesics PRN  Orders:  -     POCT Urinalysis w/ Microscope  -     Basic metabolic panel    Microscopic hematuria  Comments:  likely due to nephrolithiasis, awaiting BMP to order CT urogram, will consider cystoscopy if necessary    Urinary hesitancy  Comments:  continue Flomax, call office ASAP or go to [facility] ER if unable to urinate        Follow up in about 3 weeks (around 11/13/2019) for MD hernandez/review of imaging.

## 2019-10-24 ENCOUNTER — TELEPHONE (OUTPATIENT)
Dept: UROLOGY | Facility: CLINIC | Age: 64
End: 2019-10-24

## 2019-10-24 DIAGNOSIS — R31.29 MICROSCOPIC HEMATURIA: Primary | ICD-10-CM

## 2019-10-24 NOTE — TELEPHONE ENCOUNTER
Notified patient that Dr. García reviewed imaging and wants to schedule cystoscopy as soon as possible so scheduling will be calling him

## 2019-10-26 DIAGNOSIS — R31.29 OTHER MICROSCOPIC HEMATURIA: ICD-10-CM

## 2019-10-26 NOTE — PROGRESS NOTES
This was an orders only encounter. Patient was seen for Microhematuria and renal function was checked. We are now ordering a CT urogram. He is on Metformin and will to hold that at least 48 hours prior to the CT scan.

## 2019-10-29 NOTE — PATIENT INSTRUCTIONS
Cystoscopy    Cystoscopy is a procedure that lets your doctor look directly inside your urethra and bladder. It can be used to:  · Help diagnose a problem with your urethra, bladder, or kidneys.  · Take a sample (biopsy) of bladder or urethral tissue.  · Treat certain problems (such as removing kidney stones).  · Place a stent to bypass an obstruction.  · Take special X-rays of the kidneys.  Based on the findings, your doctor may recommend other tests or treatments.  What is a cystoscope?  A cystoscope is a telescope-like instrument that contains lenses and fiberoptics (small glass wires that make bright light). The cystoscope may be straight and rigid, or flexible to bend around curves in the urethra. The doctor may look directly into the cystoscope, or project the image onto a monitor.  Getting ready  · Ask your doctor if you should stop taking any medicines before the procedure.  · Ask whether you should avoid eating or drinking anything after midnight before the procedure.  · Follow any other instructions your doctor gives you.  Tell your doctor before the exam if you:  · Take any medicines, such as aspirin or blood thinners  · Have allergies to any medicines  · Are pregnant   The procedure  Cystoscopy is done in the doctors office, surgery center, or hospital. The doctor and a nurse are present during the procedure. It takes only a few minutes, longer if a biopsy, X-ray, or treatment needs to be done.  During the procedure:  · You lie on an exam table on your back, knees bent and legs apart. You are covered with a drape.  · Your urethra and the area around it are washed. Anesthetic jelly may be applied to numb the urethra. Other pain medicine is usually not needed. In some cases, you may be offered a mild sedative to help you relax. If a more extensive procedure is to be done, such as a biopsy or kidney stone removal, general anesthesia may be needed.  · The cystoscope is inserted. A sterile fluid is put  into the bladder to expand it. You may feel pressure from this fluid.  · When the procedure is done, the cystoscope is removed.  After the procedure  If you had a sedative, general anesthesia, or spinal anesthesia, you must have someone drive you home. Once youre home:  · Drink plenty of fluids.  · You may have burning or light bleeding when you urinate--this is normal.  · Medicines may be prescribed to ease any discomfort or prevent infection. Take these as directed.  · Call your doctor if you have heavy bleeding or blood clots, burning that lasts more than a day, a fever over 100°F  (38° C), or trouble urinating.  Date Last Reviewed: 1/1/2017 © 2000-2017 [employer]. [address] [place] [address], [place] [address]. All rights reserved. This information is not intended as a substitute for professional medical care. Always follow your healthcare professional's instructions.          Blood in the Urine    Blood in the urine (hematuria) has many possible causes. If it occurs after an injury (such as a car accident or fall), it is most often a sign of bruising to the kidney or bladder. Common causes of blood in the urine include urinary tract infections, kidney stones, inflammation, tumors, or certain other diseases of the kidney or bladder. Menstruation can cause blood to appear in the urine sample, although it is not coming from the urinary tract.  If only a trace amount of blood is present, it will show up on the urine test, even though the urine may be yellow and not pink or red. This may occur with any of the above conditions, as well as heavy exercise or high fever. In this case, your doctor may want to repeat the urine test on another day. This will show if the blood is still present. If it is, then other tests can be done to find out the cause.  Home care  Follow these home care guidelines:  · If your urine does not appear bloody (pink, brown or red) then you do not need to restrict your activity in  any way.  · If you can see blood in your urine, rest and avoid heavy exertion until your next exam. Do not use aspirin, blood thinners, or anti-platelet or anti-inflammatory medicines. These include ibuprofen and naproxen. These thin the blood and may increase bleeding.  Follow-up care  Follow up with your healthcare provider, or as advised. If you were injured and had blood in your urine, you should have a repeat urine test in 1 to 2 days. Contact your doctor for this test.  A radiologist will review any X-rays that were taken. You will be told of any new findings that may affect your care.  When to seek medical advice  Call your healthcare provider right away if any of these occur:  · Bright red blood or blood clots in the urine (if you did not have this before)  · Weakness, dizziness or fainting  · New groin, abdominal, or back pain  · Fever of 100.4ºF (38ºC) or higher, or as directed by your healthcare provider  · Repeated vomiting  · Bleeding from the nose or gums or easy bruising  Date Last Reviewed: 9/1/2016  © 7844-0366 [employer]. [address] [place] [address]. All rights reserved. This information is not intended as a substitute for professional medical care. Always follow your healthcare professional's instructions.

## 2019-10-31 ENCOUNTER — PROCEDURE VISIT (OUTPATIENT)
Dept: UROLOGY | Facility: CLINIC | Age: 64
End: 2019-10-31
Payer: COMMERCIAL

## 2019-10-31 VITALS
HEART RATE: 70 BPM | HEIGHT: 70 IN | SYSTOLIC BLOOD PRESSURE: 133 MMHG | WEIGHT: 217 LBS | BODY MASS INDEX: 31.07 KG/M2 | DIASTOLIC BLOOD PRESSURE: 81 MMHG

## 2019-10-31 DIAGNOSIS — R31.29 MICROSCOPIC HEMATURIA: ICD-10-CM

## 2019-10-31 DIAGNOSIS — N21.9 CALCULUS OF LOWER URINARY TRACT: ICD-10-CM

## 2019-10-31 DIAGNOSIS — R39.11 URINARY HESITANCY: Primary | ICD-10-CM

## 2019-10-31 DIAGNOSIS — R30.0 DYSURIA: ICD-10-CM

## 2019-10-31 PROCEDURE — 52000 CYSTOURETHROSCOPY: CPT | Mod: S$GLB,,, | Performed by: SPECIALIST

## 2019-10-31 PROCEDURE — 52000 CYSTOSCOPY: ICD-10-PCS | Mod: S$GLB,,, | Performed by: SPECIALIST

## 2019-10-31 RX ORDER — CIPROFLOXACIN 500 MG/1
500 TABLET ORAL
Status: COMPLETED | OUTPATIENT
Start: 2019-10-31 | End: 2019-10-31

## 2019-10-31 RX ADMIN — CIPROFLOXACIN 500 MG: 500 TABLET ORAL at 08:10

## 2019-10-31 NOTE — PROCEDURES
"Cystoscopy  Date/Time: 10/31/2019 8:20 AM  Performed by: Jesús García MD  Authorized by: Jesús García MD     Consent Done?:  Yes (Written)  Time out: Immediately prior to procedure a "time out" was called to verify the correct patient, procedure, equipment, support staff and site/side marked as required.    Position:  Supine  Anesthesia:  Intraurethral instillation  Preparation: Patient was prepped and draped in usual sterile fashion      Scope type:  Flexible cystoscope  External exam normal: Yes    Digital exam performed: No    Urethra normal: Yes    Prostate normal: Yes  Bladder neck normal: Bladder neck normal   Bladder normal: Yes      Patient tolerance:  Patient tolerated the procedure well with no immediate complications     Patient has a bladder stone that probably explains the physical obstruction has been experiencing when he is voiding.  He will need this bladder stone extracted in the operating room.      "

## 2019-11-01 ENCOUNTER — CLINICAL SUPPORT (OUTPATIENT)
Dept: UROLOGY | Facility: CLINIC | Age: 64
End: 2019-11-01
Payer: COMMERCIAL

## 2019-11-01 DIAGNOSIS — N21.9 CALCULUS OF LOWER URINARY TRACT: Primary | ICD-10-CM

## 2019-11-04 LAB
APPEARANCE, UA: CLEAR
BASOPHILS NFR SNV MANUAL: 1.2 % (ref 0–3)
BILIRUB UR QL STRIP: NEGATIVE MG/DL
BUN SERPL-MCNC: 13 MG/DL (ref 7–18)
BUN/CREAT SERPL: 16.45 RATIO (ref 7–18)
CALCIUM SERPL-MCNC: 9.3 MG/DL (ref 8.8–10.5)
CHLORIDE SERPL-SCNC: 100 MMOL/L (ref 100–108)
CO2 SERPL-SCNC: 29 MMOL/L (ref 21–32)
COLOR UR: NORMAL
CREAT SERPL-MCNC: 0.79 MG/DL (ref 0.7–1.3)
EOSINOPHIL NFR SNV MANUAL: 3 % (ref 1–3)
ERYTHROCYTE [DISTWIDTH] IN BLOOD BY AUTOMATED COUNT: 13 % (ref 12.5–18)
GFR ESTIMATION: > 60
GLUCOSE (UA): NORMAL MG/DL
GLUCOSE SERPL-MCNC: 133 MG/DL (ref 70–110)
HCT VFR BLD AUTO: 44.9 % (ref 42–52)
HGB BLD-MCNC: 15.4 G/DL (ref 14–18)
HGB UR QL STRIP: NEGATIVE /UL
KETONES UR QL STRIP: NEGATIVE MG/DL
LEUKOCYTE ESTERASE UR QL STRIP: NEGATIVE /UL
LYMPHOCYTES NFR SNV MANUAL: 34.3 % (ref 25–40)
MANUAL NRBC PER 100 CELLS: 0 %
MCH RBC QN AUTO: 31.6 PG (ref 27–31.2)
MCHC RBC AUTO-ENTMCNC: 34.3 G/DL (ref 31.8–35.4)
MCV RBC AUTO: 92.2 FL (ref 80–97)
MONOCYTES/100 LEUKOCYTES: 11.1 % (ref 1–15)
NEUTROPHILS NFR BLD: 3.03 10*3/UL (ref 1.8–7.7)
NEUTROPHILS NFR SNV MANUAL: 50.1 % (ref 37–80)
NITRITE UR QL STRIP: NEGATIVE
PH UR STRIP: 6.5 PH (ref 5–9)
PLATELETS: 196 10*3/UL (ref 142–424)
POTASSIUM SERPL-SCNC: 3.9 MMOL/L (ref 3.6–5.2)
PROT UR QL STRIP: NEGATIVE MG/DL
RBC # BLD AUTO: 4.87 10*6/UL (ref 4.7–6.1)
SODIUM BLD-SCNC: 138 MMOL/L (ref 135–145)
SP GR UR STRIP: 1.01 (ref 1–1.03)
SPECIMEN COLLECTION METHOD, URINE: NORMAL
UROBILINOGEN UR STRIP-ACNC: NORMAL MG/DL
WBC # BLD: 6 10*3/UL (ref 4.6–10.2)

## 2019-11-05 ENCOUNTER — OUTSIDE PLACE OF SERVICE (OUTPATIENT)
Dept: UROLOGY | Facility: CLINIC | Age: 64
End: 2019-11-05
Payer: COMMERCIAL

## 2019-11-05 LAB
GLUCOSE SERPL-MCNC: 115 MG/DL (ref 70–105)
GLUCOSE SERPL-MCNC: 121 MG/DL (ref 70–105)

## 2019-11-05 PROCEDURE — 74420 UROGRAPHY RTRGR +-KUB: CPT | Mod: 26,,, | Performed by: SPECIALIST

## 2019-11-05 PROCEDURE — 74420 PR  X-RAY RETROGRADE PYELOGRAM: ICD-10-PCS | Mod: 26,,, | Performed by: SPECIALIST

## 2019-11-05 PROCEDURE — 52317 REMOVE BLADDER STONE: CPT | Mod: ,,, | Performed by: SPECIALIST

## 2019-11-05 PROCEDURE — 52317 PR REMOVE BLADDER STONE,<2.5 CM: ICD-10-PCS | Mod: ,,, | Performed by: SPECIALIST

## 2019-11-09 LAB
CALCULI COMPOSITION: NORMAL
CALCULI DESCRIPTION: NORMAL
CALCULI MASS: 228 MG
CALCULI NUMBER: NORMAL
CALCULI PDF: NORMAL
CALCULI SIZE: NORMAL MM

## 2019-11-11 PROBLEM — Z00.00 WELLNESS EXAMINATION: Status: RESOLVED | Noted: 2019-08-12 | Resolved: 2019-11-11

## 2019-12-17 ENCOUNTER — OFFICE VISIT (OUTPATIENT)
Dept: FAMILY MEDICINE | Facility: CLINIC | Age: 64
End: 2019-12-17
Payer: COMMERCIAL

## 2019-12-17 VITALS
RESPIRATION RATE: 18 BRPM | TEMPERATURE: 98 F | DIASTOLIC BLOOD PRESSURE: 82 MMHG | HEART RATE: 76 BPM | WEIGHT: 221 LBS | SYSTOLIC BLOOD PRESSURE: 138 MMHG | OXYGEN SATURATION: 98 % | HEIGHT: 70 IN | BODY MASS INDEX: 31.64 KG/M2

## 2019-12-17 DIAGNOSIS — E11.9 TYPE 2 DIABETES MELLITUS WITHOUT COMPLICATION, WITHOUT LONG-TERM CURRENT USE OF INSULIN: ICD-10-CM

## 2019-12-17 DIAGNOSIS — Z12.11 SCREENING FOR MALIGNANT NEOPLASM OF COLON: ICD-10-CM

## 2019-12-17 DIAGNOSIS — E78.2 MIXED HYPERLIPIDEMIA: Primary | ICD-10-CM

## 2019-12-17 PROCEDURE — 99214 OFFICE O/P EST MOD 30 MIN: CPT | Mod: S$GLB,,, | Performed by: FAMILY MEDICINE

## 2019-12-17 PROCEDURE — 3008F PR BODY MASS INDEX (BMI) DOCUMENTED: ICD-10-PCS | Mod: CPTII,S$GLB,, | Performed by: FAMILY MEDICINE

## 2019-12-17 PROCEDURE — 3008F BODY MASS INDEX DOCD: CPT | Mod: CPTII,S$GLB,, | Performed by: FAMILY MEDICINE

## 2019-12-17 PROCEDURE — 99214 PR OFFICE/OUTPT VISIT, EST, LEVL IV, 30-39 MIN: ICD-10-PCS | Mod: S$GLB,,, | Performed by: FAMILY MEDICINE

## 2019-12-17 RX ORDER — ATORVASTATIN CALCIUM 40 MG/1
40 TABLET, FILM COATED ORAL NIGHTLY
Qty: 90 TABLET | Refills: 1 | Status: SHIPPED | OUTPATIENT
Start: 2019-12-17 | End: 2020-05-27 | Stop reason: SDUPTHER

## 2019-12-17 RX ORDER — METFORMIN HYDROCHLORIDE 500 MG/1
500 TABLET, EXTENDED RELEASE ORAL
Qty: 90 TABLET | Refills: 1 | Status: SHIPPED | OUTPATIENT
Start: 2019-12-17 | End: 2020-05-27 | Stop reason: SDUPTHER

## 2019-12-17 NOTE — PROGRESS NOTES
Subjective:      Patient ID: Ousmane Vega is a 64 y.o. male.    Chief Complaint: Follow-up      HPI:  64-year-old white male past medical history of diabetes, hyperlipidemia and nephrolithiasis who presents for follow-up of chronic medical problems.  Reports good urinary stream.  Tolerating metformin and Lipitor with no complications.  He is with out acute complaints today.  He was not able to make his colonoscopy due to bladder stone.    Past Medical History:   Diagnosis Date    Arthritis     Diabetes mellitus, type 2     pre diabetic    Hyperlipidemia      Past Surgical History:   Procedure Laterality Date    HAND SURGERY      LEG SURGERY      plate to right lower leg from fx      Family History   Problem Relation Age of Onset    Heart disease Mother     Hyperlipidemia Mother     Hypertension Mother     Heart disease Father     Hyperlipidemia Father     Hypertension Father     Hyperlipidemia Sister     Heart attack Brother     Hypertension Brother     Heart attack Maternal Grandfather     Alzheimer's disease Paternal Grandfather      Social History     Socioeconomic History    Marital status:      Spouse name: Not on file    Number of children: Not on file    Years of education: Not on file    Highest education level: Not on file   Occupational History    Not on file   Social Needs    Financial resource strain: Not on file    Food insecurity:     Worry: Not on file     Inability: Not on file    Transportation needs:     Medical: Not on file     Non-medical: Not on file   Tobacco Use    Smoking status: Never Smoker    Smokeless tobacco: Current User     Types: Snuff   Substance and Sexual Activity    Alcohol use: Yes     Alcohol/week: 4.0 standard drinks     Types: 4 Cans of beer per week     Frequency: 2-3 times a week    Drug use: Never    Sexual activity: Not on file   Lifestyle    Physical activity:     Days per week: Not on file     Minutes per session: Not on file     "Stress: Not on file   Relationships    Social connections:     Talks on phone: Not on file     Gets together: Not on file     Attends Sikhism service: Not on file     Active member of club or organization: Not on file     Attends meetings of clubs or organizations: Not on file     Relationship status: Not on file   Other Topics Concern    Not on file   Social History Narrative    Not on file     Review of patient's allergies indicates:  No Known Allergies    Review of Systems   Constitutional: Negative for activity change, appetite change, chills, fatigue, fever and unexpected weight change.   HENT: Negative for congestion, ear pain, rhinorrhea, sinus pressure, sinus pain, sneezing, sore throat and trouble swallowing.    Eyes: Negative for photophobia, pain and itching.   Respiratory: Negative for cough, chest tightness, shortness of breath and wheezing.    Cardiovascular: Negative for chest pain, palpitations and leg swelling.   Gastrointestinal: Negative for abdominal distention, abdominal pain, constipation, diarrhea, nausea and vomiting.   Endocrine: Negative for cold intolerance, heat intolerance, polydipsia and polyphagia.   Genitourinary: Negative for difficulty urinating, dysuria and frequency.   Musculoskeletal: Negative for arthralgias, joint swelling and myalgias.   Skin: Negative for pallor and rash.   Neurological: Negative for dizziness, seizures, syncope, speech difficulty and headaches.   Hematological: Negative for adenopathy. Does not bruise/bleed easily.   Psychiatric/Behavioral: Negative for agitation, behavioral problems and hallucinations.       Objective:       /82 (BP Location: Left arm, Patient Position: Sitting, BP Method: Large (Manual))   Pulse 76   Temp 98.4 °F (36.9 °C) (Oral)   Resp 18   Ht 5' 10" (1.778 m)   Wt 100.2 kg (221 lb)   SpO2 98%   BMI 31.71 kg/m²   Physical Exam   Constitutional: He is oriented to person, place, and time. He appears well-developed and " well-nourished.   HENT:   Head: Normocephalic and atraumatic.   Nose: Nose normal.   Mouth/Throat: Oropharynx is clear and moist.   Eyes: Pupils are equal, round, and reactive to light. Conjunctivae and EOM are normal.   Neck: Normal range of motion. Neck supple.   Cardiovascular: Normal rate, regular rhythm, normal heart sounds and intact distal pulses.   Pulmonary/Chest: Effort normal and breath sounds normal.   Abdominal: Soft. There is no tenderness.   Musculoskeletal: Normal range of motion.   Neurological: He is alert and oriented to person, place, and time.   Skin: Skin is warm and dry.   Psychiatric: He has a normal mood and affect. His behavior is normal. Judgment and thought content normal.   Nursing note and vitals reviewed.      Assessment:     1. Mixed hyperlipidemia    2. Type 2 diabetes mellitus without complication, without long-term current use of insulin    3. Screening for malignant neoplasm of colon        Plan:   Mixed hyperlipidemia  -     atorvastatin (LIPITOR) 40 MG tablet; Take 1 tablet (40 mg total) by mouth every evening.  Dispense: 90 tablet; Refill: 1  -     Lipid panel; Future; Expected date: 12/17/2019    Type 2 diabetes mellitus without complication, without long-term current use of insulin  -     metFORMIN (GLUCOPHAGE-XR) 500 MG 24 hr tablet; Take 1 tablet (500 mg total) by mouth daily with breakfast.  Dispense: 90 tablet; Refill: 1  -     Hemoglobin A1c; Future; Expected date: 12/17/2019  -     Comprehensive metabolic panel; Future; Expected date: 12/17/2019  -     Microalbumin/Creatinine Ratio, urine    Screening for malignant neoplasm of colon  -     Ambulatory referral to General Surgery      Declined flu and pneumonia vaccine.    Will come in for fasting labs when possible.    Refill metformin and Lipitor.    Medication List with Changes/Refills   Changed and/or Refilled Medications    Modified Medication Previous Medication    ATORVASTATIN (LIPITOR) 40 MG TABLET atorvastatin  (LIPITOR) 40 MG tablet       Take 1 tablet (40 mg total) by mouth every evening.    Take 1 tablet (40 mg total) by mouth every evening.    METFORMIN (GLUCOPHAGE-XR) 500 MG 24 HR TABLET metFORMIN (GLUCOPHAGE-XR) 500 MG 24 hr tablet       Take 1 tablet (500 mg total) by mouth daily with breakfast.    Take 1 tablet (500 mg total) by mouth daily with breakfast.   Discontinued Medications    HYDROCODONE-ACETAMINOPHEN (NORCO)  MG PER TABLET    Take 1 tablet by mouth every 6 (six) hours as needed for Pain.    SULFAMETHOXAZOLE-TRIMETHOPRIM 800-160MG (BACTRIM DS) 800-160 MG TAB    Take 1 tablet by mouth 2 (two) times daily.    TAMSULOSIN (FLOMAX) 0.4 MG CAP    Take 1 capsule (0.4 mg total) by mouth once daily.            Disclaimer: This note may have been prepared using voice recognition software, it may have not been extensively proofed, as such there could be errors within the text such as sound alike errors.

## 2020-01-23 ENCOUNTER — PATIENT OUTREACH (OUTPATIENT)
Dept: ADMINISTRATIVE | Facility: HOSPITAL | Age: 65
End: 2020-01-23

## 2020-01-23 NOTE — PROGRESS NOTES
Health Maintenance Updated.   Immunizations: Abstracted.  Care Everywhere abstracted:No results found.  Lab Binh Search: Pt found. Results 7/26/2012 PSA/vitamin D.

## 2020-01-29 ENCOUNTER — PATIENT OUTREACH (OUTPATIENT)
Dept: ADMINISTRATIVE | Facility: HOSPITAL | Age: 65
End: 2020-01-29

## 2020-05-27 ENCOUNTER — OFFICE VISIT (OUTPATIENT)
Dept: FAMILY MEDICINE | Facility: CLINIC | Age: 65
End: 2020-05-27
Payer: COMMERCIAL

## 2020-05-27 VITALS
SYSTOLIC BLOOD PRESSURE: 130 MMHG | OXYGEN SATURATION: 98 % | DIASTOLIC BLOOD PRESSURE: 88 MMHG | BODY MASS INDEX: 32.84 KG/M2 | HEIGHT: 70 IN | TEMPERATURE: 99 F | RESPIRATION RATE: 20 BRPM | WEIGHT: 229.38 LBS | HEART RATE: 68 BPM

## 2020-05-27 DIAGNOSIS — E78.2 MIXED HYPERLIPIDEMIA: ICD-10-CM

## 2020-05-27 DIAGNOSIS — E11.9 TYPE 2 DIABETES MELLITUS WITHOUT COMPLICATION, WITHOUT LONG-TERM CURRENT USE OF INSULIN: Primary | ICD-10-CM

## 2020-05-27 DIAGNOSIS — Z79.899 ON LONG TERM DRUG THERAPY: ICD-10-CM

## 2020-05-27 DIAGNOSIS — Z12.11 SCREENING FOR MALIGNANT NEOPLASM OF COLON: ICD-10-CM

## 2020-05-27 PROCEDURE — 99214 OFFICE O/P EST MOD 30 MIN: CPT | Mod: S$GLB,,, | Performed by: FAMILY MEDICINE

## 2020-05-27 PROCEDURE — 3051F HG A1C>EQUAL 7.0%<8.0%: CPT | Mod: CPTII,S$GLB,, | Performed by: FAMILY MEDICINE

## 2020-05-27 PROCEDURE — 3051F PR MOST RECENT HEMOGLOBIN A1C LEVEL 7.0 - < 8.0%: ICD-10-PCS | Mod: CPTII,S$GLB,, | Performed by: FAMILY MEDICINE

## 2020-05-27 PROCEDURE — 3008F BODY MASS INDEX DOCD: CPT | Mod: CPTII,S$GLB,, | Performed by: FAMILY MEDICINE

## 2020-05-27 PROCEDURE — 99214 PR OFFICE/OUTPT VISIT, EST, LEVL IV, 30-39 MIN: ICD-10-PCS | Mod: S$GLB,,, | Performed by: FAMILY MEDICINE

## 2020-05-27 PROCEDURE — 3008F PR BODY MASS INDEX (BMI) DOCUMENTED: ICD-10-PCS | Mod: CPTII,S$GLB,, | Performed by: FAMILY MEDICINE

## 2020-05-27 RX ORDER — METFORMIN HYDROCHLORIDE 500 MG/1
500 TABLET, EXTENDED RELEASE ORAL
Qty: 90 TABLET | Refills: 0 | Status: SHIPPED | OUTPATIENT
Start: 2020-05-27 | End: 2020-10-07

## 2020-05-27 RX ORDER — ATORVASTATIN CALCIUM 40 MG/1
40 TABLET, FILM COATED ORAL NIGHTLY
Qty: 90 TABLET | Refills: 0 | Status: SHIPPED | OUTPATIENT
Start: 2020-05-27 | End: 2020-11-09 | Stop reason: SDUPTHER

## 2020-05-27 NOTE — PROGRESS NOTES
Subjective:      Patient ID: Ousmane Vega is a 65 y.o. male.    Chief Complaint: Other (check up and refills)      HPI:  65-year-old white male past history of diabetes, hyperlipidemia who presents for chronic medical management.  Has not been checking his glucose at home.  He has been out of his medicine for several days.  He missed his last blood work appointment.  Did not follow-up with his colonoscopy.  He is not interested in follow-up at this time.  Feels well without any other acute complaints.    Past Medical History:   Diagnosis Date    Arthritis     Diabetes mellitus, type 2     pre diabetic    Hyperlipidemia      Past Surgical History:   Procedure Laterality Date    HAND SURGERY      LEG SURGERY      plate to right lower leg from fx      Family History   Problem Relation Age of Onset    Heart disease Mother     Hyperlipidemia Mother     Hypertension Mother     Heart disease Father     Hyperlipidemia Father     Hypertension Father     Hyperlipidemia Sister     Heart attack Brother     Hypertension Brother     Heart attack Maternal Grandfather     Alzheimer's disease Paternal Grandfather      Social History     Socioeconomic History    Marital status:      Spouse name: Not on file    Number of children: Not on file    Years of education: Not on file    Highest education level: Not on file   Occupational History    Not on file   Social Needs    Financial resource strain: Not on file    Food insecurity:     Worry: Not on file     Inability: Not on file    Transportation needs:     Medical: Not on file     Non-medical: Not on file   Tobacco Use    Smoking status: Never Smoker    Smokeless tobacco: Current User     Types: Snuff   Substance and Sexual Activity    Alcohol use: Yes     Alcohol/week: 4.0 standard drinks     Types: 4 Cans of beer per week     Frequency: 2-3 times a week    Drug use: Never    Sexual activity: Not on file   Lifestyle    Physical activity:      "Days per week: Not on file     Minutes per session: Not on file    Stress: Not on file   Relationships    Social connections:     Talks on phone: Not on file     Gets together: Not on file     Attends Rastafarian service: Not on file     Active member of club or organization: Not on file     Attends meetings of clubs or organizations: Not on file     Relationship status: Not on file   Other Topics Concern    Not on file   Social History Narrative    Not on file     Review of patient's allergies indicates:  No Known Allergies    Review of Systems   Constitutional: Negative for activity change, appetite change, chills, fatigue, fever and unexpected weight change.   HENT: Negative for congestion, ear pain, rhinorrhea, sinus pressure, sinus pain, sneezing, sore throat and trouble swallowing.    Eyes: Negative for photophobia, pain and itching.   Respiratory: Negative for cough, chest tightness, shortness of breath and wheezing.    Cardiovascular: Negative for chest pain, palpitations and leg swelling.   Gastrointestinal: Negative for abdominal distention, abdominal pain, constipation, diarrhea, nausea and vomiting.   Endocrine: Negative for cold intolerance, heat intolerance, polydipsia and polyphagia.   Genitourinary: Negative for difficulty urinating, dysuria and frequency.   Musculoskeletal: Negative for arthralgias, joint swelling and myalgias.   Skin: Negative for pallor and rash.   Neurological: Negative for dizziness, seizures, syncope, speech difficulty and headaches.   Hematological: Negative for adenopathy. Does not bruise/bleed easily.   Psychiatric/Behavioral: Negative for agitation, behavioral problems and hallucinations.       Objective:       /88 (BP Location: Right arm, Patient Position: Sitting, BP Method: Medium (Manual))   Pulse 68   Temp 98.6 °F (37 °C) (Oral)   Resp 20   Ht 5' 10" (1.778 m)   Wt 104.1 kg (229 lb 6.4 oz)   SpO2 98%   BMI 32.92 kg/m²   Physical Exam   Constitutional: He " is oriented to person, place, and time. He appears well-developed and well-nourished.   HENT:   Head: Normocephalic and atraumatic.   Nose: Nose normal.   Mouth/Throat: Oropharynx is clear and moist.   Eyes: Pupils are equal, round, and reactive to light. Conjunctivae and EOM are normal.   Neck: Normal range of motion. Neck supple.   Cardiovascular: Normal rate, regular rhythm, normal heart sounds and intact distal pulses.   Pulses:       Dorsalis pedis pulses are 2+ on the right side, and 2+ on the left side.        Posterior tibial pulses are 2+ on the right side, and 2+ on the left side.   Pulmonary/Chest: Effort normal and breath sounds normal.   Abdominal: Soft. There is no tenderness.   Musculoskeletal: Normal range of motion.        Right foot: There is normal range of motion and no deformity.        Left foot: There is normal range of motion and no deformity.   Feet:   Right Foot:   Protective Sensation: 10 sites tested. 10 sites sensed.   Skin Integrity: Negative for ulcer, blister, skin breakdown, erythema, warmth, callus or dry skin.   Left Foot:   Protective Sensation: 10 sites tested. 10 sites sensed.   Skin Integrity: Negative for ulcer, blister, skin breakdown, erythema, warmth, callus or dry skin.   Neurological: He is alert and oriented to person, place, and time.   Skin: Skin is warm and dry.   Psychiatric: He has a normal mood and affect. His behavior is normal. Judgment and thought content normal.   Nursing note and vitals reviewed.      Assessment:     1. Type 2 diabetes mellitus without complication, without long-term current use of insulin    2. Mixed hyperlipidemia    3. Screening for malignant neoplasm of colon    4. On long term drug therapy        Plan:   Type 2 diabetes mellitus without complication, without long-term current use of insulin  -     metFORMIN (GLUCOPHAGE-XR) 500 MG XR 24hr tablet; Take 1 tablet (500 mg total) by mouth daily with breakfast.  Dispense: 90 tablet; Refill: 0  -      Hemoglobin A1C; Future; Expected date: 05/27/2020  -     Microalbumin/creatinine urine ratio; Future; Expected date: 05/27/2020  -     POCT Glucose, Hand-Held Device; Future; Expected date: 05/27/2020    Mixed hyperlipidemia  -     atorvastatin (LIPITOR) 40 MG tablet; Take 1 tablet (40 mg total) by mouth every evening.  Dispense: 90 tablet; Refill: 0  -     Comprehensive metabolic panel; Future; Expected date: 05/27/2020  -     Lipid Panel; Future; Expected date: 05/27/2020    Screening for malignant neoplasm of colon    On long term drug therapy  -     CBC auto differential; Future; Expected date: 05/27/2020  -     Comprehensive metabolic panel; Future; Expected date: 05/27/2020      Refill meds for 3 months.  Will come by in the next week or 2 to get blood work.    Adjust medications as needed.    Declined colonoscopy at this time.  Will discuss again on follow-up.        Medication List with Changes/Refills   Changed and/or Refilled Medications    Modified Medication Previous Medication    ATORVASTATIN (LIPITOR) 40 MG TABLET atorvastatin (LIPITOR) 40 MG tablet       Take 1 tablet (40 mg total) by mouth every evening.    Take 1 tablet (40 mg total) by mouth every evening.    METFORMIN (GLUCOPHAGE-XR) 500 MG XR 24HR TABLET metFORMIN (GLUCOPHAGE-XR) 500 MG 24 hr tablet       Take 1 tablet (500 mg total) by mouth daily with breakfast.    Take 1 tablet (500 mg total) by mouth daily with breakfast.            Disclaimer: This note may have been prepared using voice recognition software, it may have not been extensively proofed, as such there could be errors within the text such as sound alike errors.

## 2020-07-13 ENCOUNTER — OFFICE VISIT (OUTPATIENT)
Dept: FAMILY MEDICINE | Facility: CLINIC | Age: 65
End: 2020-07-13
Payer: COMMERCIAL

## 2020-07-13 DIAGNOSIS — J06.9 UPPER RESPIRATORY TRACT INFECTION, UNSPECIFIED TYPE: Primary | ICD-10-CM

## 2020-07-13 DIAGNOSIS — R50.9 FEVER: ICD-10-CM

## 2020-07-13 PROCEDURE — 99213 PR OFFICE/OUTPT VISIT, EST, LEVL III, 20-29 MIN: ICD-10-PCS | Mod: 95,,, | Performed by: FAMILY MEDICINE

## 2020-07-13 PROCEDURE — 99213 OFFICE O/P EST LOW 20 MIN: CPT | Mod: 95,,, | Performed by: FAMILY MEDICINE

## 2020-07-13 NOTE — PROGRESS NOTES
Subjective:      Patient ID: Ousmane Vega is a 65 y.o. male.    Chief Complaint: No chief complaint on file.    The patient location is:  home  The chief complaint leading to consultation is:  Cough fever    Visit type: audiovisual    Face to Face time with patient:  5 min  Eight minutes of total time spent on the encounter, which includes face to face time and non-face to face time preparing to see the patient (eg, review of tests), Obtaining and/or reviewing separately obtained history, Documenting clinical information in the electronic or other health record, Independently interpreting results (not separately reported) and communicating results to the patient/family/caregiver, or Care coordination (not separately reported).         Each patient to whom he or she provides medical services by telemedicine is:  (1) informed of the relationship between the physician and patient and the respective role of any other health care provider with respect to management of the patient; and (2) notified that he or she may decline to receive medical services by telemedicine and may withdraw from such care at any time.    Notes:     HPI:  65-year-old white male past history of diabetes and hyperlipidemia who presents with cough and fever.  Had a cough last week.  Report cough did have an abnormal taste.  Started with a fever last night.  Felt chills all night.  No known COVID exposure that he knows of.  He is not short of breath.  He has not noticed any change in taste or smell    Past Medical History:   Diagnosis Date    Arthritis     Diabetes mellitus, type 2     pre diabetic    Hyperlipidemia      Past Surgical History:   Procedure Laterality Date    HAND SURGERY      LEG SURGERY      plate to right lower leg from fx      Family History   Problem Relation Age of Onset    Heart disease Mother     Hyperlipidemia Mother     Hypertension Mother     Heart disease Father     Hyperlipidemia Father     Hypertension  Father     Hyperlipidemia Sister     Heart attack Brother     Hypertension Brother     Heart attack Maternal Grandfather     Alzheimer's disease Paternal Grandfather      Social History     Socioeconomic History    Marital status:      Spouse name: Not on file    Number of children: Not on file    Years of education: Not on file    Highest education level: Not on file   Occupational History    Not on file   Social Needs    Financial resource strain: Not hard at all    Food insecurity     Worry: Never true     Inability: Never true    Transportation needs     Medical: No     Non-medical: No   Tobacco Use    Smoking status: Never Smoker    Smokeless tobacco: Current User     Types: Snuff   Substance and Sexual Activity    Alcohol use: Yes     Alcohol/week: 4.0 standard drinks     Types: 4 Cans of beer per week     Frequency: 2-3 times a week     Drinks per session: 1 or 2     Binge frequency: Never    Drug use: Never    Sexual activity: Not on file   Lifestyle    Physical activity     Days per week: 6 days     Minutes per session: 150+ min    Stress: Not at all   Relationships    Social connections     Talks on phone: More than three times a week     Gets together: Three times a week     Attends Oriental orthodox service: Not on file     Active member of club or organization: Yes     Attends meetings of clubs or organizations: More than 4 times per year     Relationship status:    Other Topics Concern    Not on file   Social History Narrative    Not on file     Review of patient's allergies indicates:  No Known Allergies    Review of Systems   Constitutional: Positive for chills and fever. Negative for activity change, appetite change, fatigue and unexpected weight change.   HENT: Negative for congestion, ear pain, rhinorrhea, sinus pressure, sinus pain, sneezing, sore throat and trouble swallowing.    Eyes: Negative for photophobia, pain and itching.   Respiratory: Positive for cough.  Negative for chest tightness, shortness of breath and wheezing.    Cardiovascular: Negative for chest pain, palpitations and leg swelling.   Gastrointestinal: Negative for abdominal distention, abdominal pain, constipation, diarrhea, nausea and vomiting.   Endocrine: Negative for cold intolerance, heat intolerance, polydipsia and polyphagia.   Genitourinary: Negative for difficulty urinating, dysuria and frequency.   Musculoskeletal: Negative for arthralgias, joint swelling and myalgias.   Skin: Negative for pallor and rash.   Neurological: Negative for dizziness, seizures, syncope, speech difficulty and headaches.   Hematological: Negative for adenopathy. Does not bruise/bleed easily.   Psychiatric/Behavioral: Negative for agitation, behavioral problems and hallucinations.       Objective:       There were no vitals taken for this visit.  Physical Exam  Constitutional:       General: He is not in acute distress.     Appearance: He is well-developed. He is not diaphoretic.   HENT:      Head: Normocephalic and atraumatic.   Eyes:      Conjunctiva/sclera: Conjunctivae normal.   Neck:      Musculoskeletal: Normal range of motion.   Pulmonary:      Effort: Pulmonary effort is normal.   Musculoskeletal: Normal range of motion.   Skin:     Coloration: Skin is not pale.   Neurological:      Mental Status: He is alert and oriented to person, place, and time.   Psychiatric:         Behavior: Behavior normal.         Thought Content: Thought content normal.         Judgment: Judgment normal.         Assessment:     1. Upper respiratory tract infection, unspecified type    2. Fever        Plan:   Upper respiratory tract infection, unspecified type    Fever  -     COVID-19 Routine Screening      Home quarantine.    Tylenol as needed.    Precautions given.    Medication List with Changes/Refills   Current Medications    ATORVASTATIN (LIPITOR) 40 MG TABLET    Take 1 tablet (40 mg total) by mouth every evening.    METFORMIN  (GLUCOPHAGE-XR) 500 MG XR 24HR TABLET    Take 1 tablet (500 mg total) by mouth daily with breakfast.            Disclaimer: This note may have been prepared using voice recognition software, it may have not been extensively proofed, as such there could be errors within the text such as sound alike errors.

## 2020-10-30 ENCOUNTER — PATIENT MESSAGE (OUTPATIENT)
Dept: ADMINISTRATIVE | Facility: HOSPITAL | Age: 65
End: 2020-10-30

## 2020-11-09 ENCOUNTER — OFFICE VISIT (OUTPATIENT)
Dept: FAMILY MEDICINE | Facility: CLINIC | Age: 65
End: 2020-11-09
Payer: COMMERCIAL

## 2020-11-09 VITALS
HEIGHT: 70 IN | OXYGEN SATURATION: 98 % | BODY MASS INDEX: 30.35 KG/M2 | TEMPERATURE: 99 F | DIASTOLIC BLOOD PRESSURE: 78 MMHG | HEART RATE: 84 BPM | RESPIRATION RATE: 18 BRPM | WEIGHT: 212 LBS | SYSTOLIC BLOOD PRESSURE: 138 MMHG

## 2020-11-09 DIAGNOSIS — E78.2 MIXED HYPERLIPIDEMIA: ICD-10-CM

## 2020-11-09 DIAGNOSIS — Z12.5 SCREENING FOR MALIGNANT NEOPLASM OF PROSTATE: ICD-10-CM

## 2020-11-09 DIAGNOSIS — Z12.11 SCREENING FOR MALIGNANT NEOPLASM OF COLON: ICD-10-CM

## 2020-11-09 DIAGNOSIS — E11.9 TYPE 2 DIABETES MELLITUS WITHOUT COMPLICATION, WITHOUT LONG-TERM CURRENT USE OF INSULIN: ICD-10-CM

## 2020-11-09 DIAGNOSIS — Z79.899 ON LONG TERM DRUG THERAPY: Primary | ICD-10-CM

## 2020-11-09 PROCEDURE — 99214 PR OFFICE/OUTPT VISIT, EST, LEVL IV, 30-39 MIN: ICD-10-PCS | Mod: S$GLB,,, | Performed by: FAMILY MEDICINE

## 2020-11-09 PROCEDURE — 3008F BODY MASS INDEX DOCD: CPT | Mod: CPTII,S$GLB,, | Performed by: FAMILY MEDICINE

## 2020-11-09 PROCEDURE — 99214 OFFICE O/P EST MOD 30 MIN: CPT | Mod: S$GLB,,, | Performed by: FAMILY MEDICINE

## 2020-11-09 PROCEDURE — 3008F PR BODY MASS INDEX (BMI) DOCUMENTED: ICD-10-PCS | Mod: CPTII,S$GLB,, | Performed by: FAMILY MEDICINE

## 2020-11-09 RX ORDER — METFORMIN HYDROCHLORIDE 500 MG/1
1000 TABLET, EXTENDED RELEASE ORAL
Qty: 180 TABLET | Refills: 1 | Status: SHIPPED | OUTPATIENT
Start: 2020-11-09 | End: 2021-05-17

## 2020-11-09 RX ORDER — ATORVASTATIN CALCIUM 40 MG/1
40 TABLET, FILM COATED ORAL NIGHTLY
Qty: 90 TABLET | Refills: 0 | Status: SHIPPED | OUTPATIENT
Start: 2020-11-09 | End: 2022-08-25 | Stop reason: SDUPTHER

## 2020-11-09 NOTE — PROGRESS NOTES
Subjective:      Patient ID: Ousmane Vega is a 65 y.o. male.    Chief Complaint: Follow-up      HPI:  65-YEAR-OLD WHITE MALE PAST HISTORY OF DIABETES, HYPERLIPIDEMIA and diverticulitis who presents for chronic medical management.  Has some cramps therefore he stopped taking his atorvastatin.  Ran out of his metformin 2-3 days ago.  He has not been following his good diet as he used to.  Last time he checked his glucose is around 200.  He had to cancel his colonoscopy.  Is interested another colonoscopy.    Past Medical History:   Diagnosis Date    Arthritis     Diabetes mellitus, type 2     pre diabetic    Hyperlipidemia      Past Surgical History:   Procedure Laterality Date    HAND SURGERY      LEG SURGERY      plate to right lower leg from fx      Family History   Problem Relation Age of Onset    Heart disease Mother     Hyperlipidemia Mother     Hypertension Mother     Heart disease Father     Hyperlipidemia Father     Hypertension Father     Hyperlipidemia Sister     Heart attack Brother     Hypertension Brother     Heart attack Maternal Grandfather     Alzheimer's disease Paternal Grandfather      Social History     Socioeconomic History    Marital status:      Spouse name: Not on file    Number of children: Not on file    Years of education: Not on file    Highest education level: Not on file   Occupational History    Not on file   Social Needs    Financial resource strain: Not hard at all    Food insecurity     Worry: Never true     Inability: Never true    Transportation needs     Medical: No     Non-medical: No   Tobacco Use    Smoking status: Never Smoker    Smokeless tobacco: Current User     Types: Snuff   Substance and Sexual Activity    Alcohol use: Yes     Alcohol/week: 4.0 standard drinks     Types: 4 Cans of beer per week     Frequency: 2-3 times a week     Drinks per session: 1 or 2     Binge frequency: Never    Drug use: Never    Sexual activity: Not on  "file   Lifestyle    Physical activity     Days per week: 6 days     Minutes per session: 150+ min    Stress: Not at all   Relationships    Social connections     Talks on phone: More than three times a week     Gets together: Three times a week     Attends Jew service: Not on file     Active member of club or organization: Yes     Attends meetings of clubs or organizations: More than 4 times per year     Relationship status:    Other Topics Concern    Not on file   Social History Narrative    Not on file     Review of patient's allergies indicates:  No Known Allergies    Review of Systems   Constitutional: Negative for activity change, appetite change, chills, fatigue, fever and unexpected weight change.   HENT: Negative for congestion, ear pain, rhinorrhea, sinus pressure, sinus pain, sneezing, sore throat and trouble swallowing.    Eyes: Negative for photophobia, pain and itching.   Respiratory: Negative for cough, chest tightness, shortness of breath and wheezing.    Cardiovascular: Negative for chest pain, palpitations and leg swelling.   Gastrointestinal: Negative for abdominal distention, abdominal pain, constipation, diarrhea, nausea and vomiting.   Endocrine: Negative for cold intolerance, heat intolerance, polydipsia and polyphagia.   Genitourinary: Negative for difficulty urinating, dysuria and frequency.   Musculoskeletal: Negative for arthralgias, joint swelling and myalgias.   Skin: Negative for pallor and rash.   Neurological: Negative for dizziness, seizures, syncope, speech difficulty and headaches.   Hematological: Negative for adenopathy. Does not bruise/bleed easily.   Psychiatric/Behavioral: Negative for agitation, behavioral problems and hallucinations.       Objective:       /78 (BP Location: Left arm, Patient Position: Sitting, BP Method: Large (Manual))   Pulse 84   Temp 98.6 °F (37 °C) (Oral)   Resp 18   Ht 5' 10" (1.778 m)   Wt 96.2 kg (212 lb)   SpO2 98%   " BMI 30.42 kg/m²   Physical Exam  Vitals signs and nursing note reviewed.   Constitutional:       Appearance: He is well-developed.   HENT:      Head: Normocephalic and atraumatic.      Nose: Nose normal.   Eyes:      Conjunctiva/sclera: Conjunctivae normal.      Pupils: Pupils are equal, round, and reactive to light.   Neck:      Musculoskeletal: Normal range of motion and neck supple.   Cardiovascular:      Rate and Rhythm: Normal rate and regular rhythm.      Heart sounds: Normal heart sounds.   Pulmonary:      Effort: Pulmonary effort is normal.      Breath sounds: Normal breath sounds.   Abdominal:      Palpations: Abdomen is soft.      Tenderness: There is no abdominal tenderness.   Musculoskeletal: Normal range of motion.   Skin:     General: Skin is warm and dry.   Neurological:      Mental Status: He is alert and oriented to person, place, and time.   Psychiatric:         Behavior: Behavior normal.         Thought Content: Thought content normal.         Judgment: Judgment normal.         Assessment:     1. On long term drug therapy    2. Type 2 diabetes mellitus without complication, without long-term current use of insulin    3. Mixed hyperlipidemia    4. Screening for malignant neoplasm of colon        Plan:   On long term drug therapy  -     CBC Auto Differential; Future; Expected date: 11/09/2020  -     TSH; Future; Expected date: 11/09/2020  -     T4, Free; Future; Expected date: 11/09/2020    Type 2 diabetes mellitus without complication, without long-term current use of insulin  -     metFORMIN (GLUCOPHAGE-XR) 500 MG ER 24hr tablet; Take 2 tablets (1,000 mg total) by mouth daily with breakfast.  Dispense: 180 tablet; Refill: 1  -     POCT Glucose, Hand-Held Device; Future; Expected date: 11/09/2020  -     Hemoglobin A1C; Future; Expected date: 11/09/2020  -     Microalbumin/Creatinine Ratio, Urine; Future; Expected date: 11/09/2020  -     Comprehensive Metabolic Panel; Future; Expected date:  11/09/2020  -     Lipid Panel; Future; Expected date: 11/09/2020    Mixed hyperlipidemia  -     atorvastatin (LIPITOR) 40 MG tablet; Take 1 tablet (40 mg total) by mouth every evening.  Dispense: 90 tablet; Refill: 0    Screening for malignant neoplasm of colon  -     Ambulatory referral/consult to General Surgery; Future; Expected date: 11/16/2020      Was tolerating metformin.  Increase metformin to 1000.  Will likely need a 2nd medication given gross hyperglycemia.    Trial Co Q10.  Then resume atorvastatin.    Refer back to surgery for colonoscopy.    Declined flu vaccine.    Follow-up in 2-3 months.    Labs pending.    Medication List with Changes/Refills   Changed and/or Refilled Medications    Modified Medication Previous Medication    ATORVASTATIN (LIPITOR) 40 MG TABLET atorvastatin (LIPITOR) 40 MG tablet       Take 1 tablet (40 mg total) by mouth every evening.    Take 1 tablet (40 mg total) by mouth every evening.    METFORMIN (GLUCOPHAGE-XR) 500 MG ER 24HR TABLET metFORMIN (GLUCOPHAGE-XR) 500 MG ER 24hr tablet       Take 2 tablets (1,000 mg total) by mouth daily with breakfast.    TAKE ONE TABLET BY MOUTH DAILY WITH BREAKFAST            Disclaimer: This note may have been prepared using voice recognition software, it may have not been extensively proofed, as such there could be errors within the text such as sound alike errors.

## 2020-11-10 LAB
ABS NRBC COUNT: 0 X 10 3/UL (ref 0–0.01)
ABSOLUTE BASOPHIL: 0.07 X 10 3/UL (ref 0–0.22)
ABSOLUTE EOSINOPHIL: 0.22 X 10 3/UL (ref 0.04–0.54)
ABSOLUTE IMMATURE GRAN: 0.01 X 10 3/UL (ref 0–0.04)
ABSOLUTE LYMPHOCYTE: 1.8 X 10 3/UL (ref 0.86–4.75)
ABSOLUTE MONOCYTE: 0.5 X 10 3/UL (ref 0.22–1.08)
ALBUMIN SERPL-MCNC: 4.6 G/DL (ref 3.5–5.2)
ALBUMIN/GLOB SERPL ELPH: 1.9 {RATIO} (ref 1–2.7)
ALP ISOS SERPL LEV INH-CCNC: 49 U/L (ref 40–130)
ALT (SGPT): 89 U/L (ref 0–41)
ANION GAP SERPL CALC-SCNC: 10 MMOL/L (ref 8–17)
AST SERPL-CCNC: 62 U/L (ref 0–40)
BASOPHILS NFR BLD: 1.2 % (ref 0.2–1.2)
BILIRUBIN, TOTAL: 0.6 MG/DL (ref 0–1.2)
BUN/CREAT SERPL: 16.1 (ref 6–20)
CALCIUM SERPL-MCNC: 9.5 MG/DL (ref 8.6–10.2)
CARBON DIOXIDE, CO2: 25 MMOL/L (ref 22–29)
CHLORIDE: 99 MMOL/L (ref 98–107)
CHOLEST SERPL-MSCNC: 266 MG/DL (ref 100–200)
CREAT SERPL-MCNC: 0.79 MG/DL (ref 0.7–1.2)
CREATININE RANDOM URINE: 147.7 MG/DL (ref 39–259)
EOSINOPHIL NFR BLD: 3.8 % (ref 0.7–7)
ESTIMATED AVERAGE GLUCOSE: 258 MG/DL
GFR ESTIMATION: 99.06
GLOBULIN: 2.4 G/DL (ref 1.5–4.5)
GLUCOSE: 307 MG/DL (ref 82–115)
HBA1C MFR BLD: 10.6 % (ref 4–6)
HCT VFR BLD AUTO: 47.4 % (ref 42–52)
HDLC SERPL-MCNC: 43 MG/DL
HGB BLD-MCNC: 16.5 G/DL (ref 14–18)
IMMATURE GRANULOCYTES: 0.2 % (ref 0–0.5)
LDL/HDL RATIO: ABNORMAL
LDLC SERPL CALC-MCNC: ABNORMAL MG/DL
LYMPHOCYTES NFR BLD: 31.3 % (ref 19.3–53.1)
MCH RBC QN AUTO: 32.6 PG (ref 27–32)
MCHC RBC AUTO-ENTMCNC: 34.8 G/DL (ref 32–36)
MCV RBC AUTO: 93.7 FL (ref 80–94)
MICROALBUMIN QUANT: 5.9 MG/DL (ref 0–2)
MICROALBUMIN/CREATININE RATIO: 39.95 UG/MG (ref 0–30)
MONOCYTES NFR BLD: 8.7 % (ref 4.7–12.5)
NEUTROPHILS # BLD AUTO: 3.16 X 10 3/UL (ref 2.15–7.56)
NEUTROPHILS NFR BLD: 54.8 % (ref 34–71.1)
NUCLEATED RED BLOOD CELLS: 0 /100 WBC (ref 0–0.2)
PLATELET # BLD AUTO: 145 X 10 3/UL (ref 135–400)
POTASSIUM: 4.3 MMOL/L (ref 3.5–5.1)
PROT SNV-MCNC: 7 G/DL (ref 6.4–8.3)
RBC # BLD AUTO: 5.06 X 10 6/UL (ref 4.7–6.1)
RDW-SD: 43.1 FL (ref 37–54)
SODIUM: 134 MMOL/L (ref 136–145)
T4, FREE: 1.35 NG/DL (ref 0.93–1.7)
TRIGL SERPL-MCNC: 437 MG/DL (ref 0–150)
TSH SERPL DL<=0.005 MIU/L-ACNC: 0.72 UIU/ML (ref 0.27–4.2)
UREA NITROGEN (BUN): 12.7 MG/DL (ref 8–23)
WBC # BLD: 5.76 X 10 3/UL (ref 4.3–10.8)

## 2020-11-11 LAB — PSA, DIAGNOSTIC: 0.34 NG/ML (ref 0–4)

## 2020-11-17 ENCOUNTER — OFFICE VISIT (OUTPATIENT)
Dept: SURGERY | Facility: CLINIC | Age: 65
End: 2020-11-17
Payer: COMMERCIAL

## 2020-11-17 DIAGNOSIS — Z78.9 AT AVERAGE RISK FOR MALIGNANT NEOPLASM OF COLON: ICD-10-CM

## 2020-11-17 PROCEDURE — 99202 OFFICE O/P NEW SF 15 MIN: CPT | Mod: S$GLB,,, | Performed by: SURGERY

## 2020-11-17 PROCEDURE — 99202 PR OFFICE/OUTPT VISIT, NEW, LEVL II, 15-29 MIN: ICD-10-PCS | Mod: S$GLB,,, | Performed by: SURGERY

## 2020-11-17 NOTE — LETTER
November 17, 2020      Jese Morales MD  [address] [facility] [address]           [facility] - General Surgery  [address]  [facility] [address]  Phone: [phone]  Fax: [phone]          Patient: Ousmane Vega   MR Number: [id number]   YOB: 1955   Date of Visit: 11/17/2020       Dear Dr. Jese Morales:    Thank you for referring Ousmane Vega to me for evaluation. Attached you will find relevant portions of my assessment and plan of care.    If you have questions, please do not hesitate to call me. I look forward to following Ousmane Vega along with you.    Sincerely,    Ousmane Tariq MD    Enclosure  CC:  No Recipients    If you would like to receive this communication electronically, please contact [email or web address] or [phone] to request more information on [employer] Link access.    For providers and/or their staff who would like to refer a patient to [facility], please contact us through our one-stop-shop provider referral line, [facility] , at [phone].    If you feel you have received this communication in error or would no longer like to receive these types of communications, please e-mail [email or web address]

## 2020-11-17 NOTE — PROGRESS NOTES
Subjective:       Patient ID: Ousmane Vega is a 65 y.o. male.    Chief Complaint: Colonoscopy    Patient is referred by Dr. Jese Morales for screening colonoscopy.  He has not had prior screening.  He is asymptomatic.  No family history of colorectal cancer.    Past Medical History:   Diagnosis Date    Arthritis     Diabetes mellitus, type 2     pre diabetic    Hyperlipidemia      Past Surgical History:   Procedure Laterality Date    HAND SURGERY      LEG SURGERY      plate to right lower leg from fx      Family History   Problem Relation Age of Onset    Heart disease Mother     Hyperlipidemia Mother     Hypertension Mother     Heart disease Father     Hyperlipidemia Father     Hypertension Father     Hyperlipidemia Sister     Heart attack Brother     Hypertension Brother     Heart attack Maternal Grandfather     Alzheimer's disease Paternal Grandfather      Social History     Socioeconomic History    Marital status:      Spouse name: Not on file    Number of children: Not on file    Years of education: Not on file    Highest education level: Not on file   Occupational History    Not on file   Social Needs    Financial resource strain: Not hard at all    Food insecurity     Worry: Never true     Inability: Never true    Transportation needs     Medical: No     Non-medical: No   Tobacco Use    Smoking status: Never Smoker    Smokeless tobacco: Current User     Types: Snuff   Substance and Sexual Activity    Alcohol use: Yes     Alcohol/week: 4.0 standard drinks     Types: 4 Cans of beer per week     Frequency: 2-3 times a week     Drinks per session: 1 or 2     Binge frequency: Never    Drug use: Never    Sexual activity: Not on file   Lifestyle    Physical activity     Days per week: 6 days     Minutes per session: 150+ min    Stress: Not at all   Relationships    Social connections     Talks on phone: More than three times a week     Gets together: Three times a week      Attends Baptism service: Not on file     Active member of club or organization: Yes     Attends meetings of clubs or organizations: More than 4 times per year     Relationship status:    Other Topics Concern    Not on file   Social History Narrative    Not on file       Current Outpatient Medications   Medication Sig Dispense Refill    atorvastatin (LIPITOR) 40 MG tablet Take 1 tablet (40 mg total) by mouth every evening. 90 tablet 0    metFORMIN (GLUCOPHAGE-XR) 500 MG ER 24hr tablet Take 2 tablets (1,000 mg total) by mouth daily with breakfast. 180 tablet 1     No current facility-administered medications for this visit.      Review of patient's allergies indicates:  No Known Allergies    Review of Systems   Constitutional: Negative.    Respiratory: Negative.    Cardiovascular: Negative.    Gastrointestinal: Negative.    Musculoskeletal: Negative.    Skin: Negative.    Hematological: Negative.        Objective:      There were no vitals filed for this visit.  Physical Exam  Constitutional:       General: He is not in acute distress.  Neck:      Musculoskeletal: Normal range of motion and neck supple.   Cardiovascular:      Rate and Rhythm: Normal rate and regular rhythm.      Heart sounds: Normal heart sounds.   Pulmonary:      Effort: Pulmonary effort is normal.      Breath sounds: Normal breath sounds.   Abdominal:      General: Abdomen is flat. Bowel sounds are normal.      Palpations: Abdomen is soft. There is no mass.   Musculoskeletal: Normal range of motion.         General: No swelling or tenderness.   Skin:     General: Skin is warm and dry.   Neurological:      Mental Status: He is alert.          Assessment:       Average risk for colorectal cancer   Plan:       Screening colonoscopy will be scheduled in December 2020 at a time convenient for the patient.

## 2021-02-23 ENCOUNTER — OFFICE VISIT (OUTPATIENT)
Dept: FAMILY MEDICINE | Facility: CLINIC | Age: 66
End: 2021-02-23

## 2021-02-23 DIAGNOSIS — J34.89 RHINORRHEA: Primary | ICD-10-CM

## 2021-02-23 LAB
CTP QC/QA: YES
SARS-COV-2 RDRP RESP QL NAA+PROBE: NEGATIVE

## 2021-02-23 PROCEDURE — U0002: ICD-10-PCS | Mod: QW,,, | Performed by: FAMILY MEDICINE

## 2021-02-23 PROCEDURE — U0002 COVID-19 LAB TEST NON-CDC: HCPCS | Mod: QW,,, | Performed by: FAMILY MEDICINE

## 2021-07-15 DIAGNOSIS — E11.9 TYPE 2 DIABETES MELLITUS WITHOUT COMPLICATION, WITHOUT LONG-TERM CURRENT USE OF INSULIN: ICD-10-CM

## 2021-07-15 RX ORDER — METFORMIN HYDROCHLORIDE 500 MG/1
1000 TABLET, EXTENDED RELEASE ORAL DAILY
Qty: 60 TABLET | Refills: 3 | Status: SHIPPED | OUTPATIENT
Start: 2021-07-15 | End: 2021-11-15

## 2021-08-04 ENCOUNTER — PATIENT MESSAGE (OUTPATIENT)
Dept: ADMINISTRATIVE | Facility: HOSPITAL | Age: 66
End: 2021-08-04

## 2021-10-18 LAB
ALBUMIN SERPL BCP-MCNC: 3.7 G/DL (ref 3.5–5.2)
ALBUMIN/GLOB SERPL ELPH: 1.3 {RATIO} (ref 1–2.7)
ALP SERPL-CCNC: 240 U/L (ref 40–130)
ALT SERPL W P-5'-P-CCNC: 50 U/L (ref 0–40)
ANION GAP SERPL CALC-SCNC: 8 MMOL/L (ref 8–17)
AST SERPL-CCNC: 86 U/L (ref 0–40)
BILIRUB SERPL-MCNC: 0.3 MG/DL (ref 0–1.2)
BUN SERPL-MCNC: 18.2 MG/DL (ref 8–23)
BUN/CREAT RATIO: 20.7 (ref 6–20)
CALCIUM SERPL-MCNC: 9.9 MG/DL (ref 8.6–10.2)
CHLORIDE SERPL-SCNC: 108 MMOL/L (ref 98–107)
CO2 SERPL-SCNC: 21 MMOL/L (ref 22–29)
CREAT SERPL-MCNC: 0.9 MG/DL (ref 0.7–1.2)
GFR ESTIMATION: 83.75 >60.00
GLOBULIN: 2.8 (ref 1.5–4.5)
GLUCOSE SERPL-MCNC: 92 MG/DL (ref 82–115)
POTASSIUM SERPL-SCNC: 6 MMOL/L (ref 3.5–5.1)
PROT SERPL-MCNC: 6.5 G/DL (ref 6.4–8.3)
SODIUM BLD-SCNC: 137 MMOL/L (ref 136–145)

## 2021-11-03 ENCOUNTER — PATIENT MESSAGE (OUTPATIENT)
Dept: ADMINISTRATIVE | Facility: HOSPITAL | Age: 66
End: 2021-11-03

## 2021-12-03 ENCOUNTER — PATIENT OUTREACH (OUTPATIENT)
Dept: ADMINISTRATIVE | Facility: HOSPITAL | Age: 66
End: 2021-12-03

## 2022-02-02 ENCOUNTER — PATIENT MESSAGE (OUTPATIENT)
Dept: ADMINISTRATIVE | Facility: HOSPITAL | Age: 67
End: 2022-02-02

## 2022-03-02 ENCOUNTER — PATIENT OUTREACH (OUTPATIENT)
Dept: ADMINISTRATIVE | Facility: HOSPITAL | Age: 67
End: 2022-03-02

## 2022-03-02 NOTE — PROGRESS NOTES
[id number] pt to schedule a visit d/t elevated A1C on his last visit over a year ago. [facility].   Uploading and hyper-linking CMP done 10.18.2021.

## 2022-08-25 ENCOUNTER — PATIENT MESSAGE (OUTPATIENT)
Dept: ADMINISTRATIVE | Facility: HOSPITAL | Age: 67
End: 2022-08-25
Payer: COMMERCIAL

## 2022-08-25 ENCOUNTER — OFFICE VISIT (OUTPATIENT)
Dept: FAMILY MEDICINE | Facility: CLINIC | Age: 67
End: 2022-08-25
Payer: COMMERCIAL

## 2022-08-25 VITALS
HEIGHT: 70 IN | OXYGEN SATURATION: 96 % | DIASTOLIC BLOOD PRESSURE: 76 MMHG | RESPIRATION RATE: 18 BRPM | BODY MASS INDEX: 26.63 KG/M2 | TEMPERATURE: 99 F | HEART RATE: 74 BPM | SYSTOLIC BLOOD PRESSURE: 130 MMHG | WEIGHT: 186 LBS

## 2022-08-25 DIAGNOSIS — Z79.899 ON LONG TERM DRUG THERAPY: ICD-10-CM

## 2022-08-25 DIAGNOSIS — E78.2 MIXED HYPERLIPIDEMIA: ICD-10-CM

## 2022-08-25 DIAGNOSIS — Z12.5 SCREENING FOR MALIGNANT NEOPLASM OF PROSTATE: ICD-10-CM

## 2022-08-25 DIAGNOSIS — E11.9 TYPE 2 DIABETES MELLITUS WITHOUT COMPLICATION, WITHOUT LONG-TERM CURRENT USE OF INSULIN: Primary | ICD-10-CM

## 2022-08-25 DIAGNOSIS — Z12.11 SCREENING FOR MALIGNANT NEOPLASM OF COLON: ICD-10-CM

## 2022-08-25 LAB — GLUCOSE SERPL-MCNC: 347 MG/DL (ref 70–110)

## 2022-08-25 PROCEDURE — 82962 POCT GLUCOSE, HAND-HELD DEVICE: ICD-10-PCS | Mod: ,,, | Performed by: FAMILY MEDICINE

## 2022-08-25 PROCEDURE — 99214 OFFICE O/P EST MOD 30 MIN: CPT | Mod: S$GLB,,, | Performed by: FAMILY MEDICINE

## 2022-08-25 PROCEDURE — 82962 GLUCOSE BLOOD TEST: CPT | Mod: ,,, | Performed by: FAMILY MEDICINE

## 2022-08-25 PROCEDURE — 99214 PR OFFICE/OUTPT VISIT, EST, LEVL IV, 30-39 MIN: ICD-10-PCS | Mod: S$GLB,,, | Performed by: FAMILY MEDICINE

## 2022-08-25 RX ORDER — ATORVASTATIN CALCIUM 40 MG/1
40 TABLET, FILM COATED ORAL NIGHTLY
Qty: 90 TABLET | Refills: 0 | Status: SHIPPED | OUTPATIENT
Start: 2022-08-25 | End: 2022-11-29 | Stop reason: SDUPTHER

## 2022-08-25 RX ORDER — METFORMIN HYDROCHLORIDE 500 MG/1
1000 TABLET, EXTENDED RELEASE ORAL DAILY
Qty: 180 TABLET | Refills: 1 | Status: SHIPPED | OUTPATIENT
Start: 2022-08-25 | End: 2023-03-10 | Stop reason: SDUPTHER

## 2022-08-25 NOTE — PROGRESS NOTES
Subjective:      Patient ID: Ousmane Vega is a 67 y.o. male.    Chief Complaint: Follow-up (Refill metformin)      HPI:  67-year-old male who presents for chronic medical management.  Has been out of his meds for 3 weeks.  Does complain of occasional blurred vision.  He thought it was secondary to his age.  He has been losing weight.  Has not been trying to lose weight.  Does not check his glucose at home.    Past Medical History:   Diagnosis Date    Arthritis     Diabetes mellitus, type 2     pre diabetic    Hyperlipidemia      Past Surgical History:   Procedure Laterality Date    HAND SURGERY      LEG SURGERY      plate to right lower leg from fx      Family History   Problem Relation Age of Onset    Heart disease Mother     Hyperlipidemia Mother     Hypertension Mother     Heart disease Father     Hyperlipidemia Father     Hypertension Father     Hyperlipidemia Sister     Heart attack Brother     Hypertension Brother     Heart attack Maternal Grandfather     Alzheimer's disease Paternal Grandfather      Social History     Socioeconomic History    Marital status:    Tobacco Use    Smoking status: Never Smoker    Smokeless tobacco: Current User     Types: Snuff   Substance and Sexual Activity    Alcohol use: Yes     Alcohol/week: 4.0 standard drinks     Types: 4 Cans of beer per week    Drug use: Never   Social History Narrative    ** Merged History Encounter **          Social Determinants of Health     Financial Resource Strain: Low Risk     Difficulty of Paying Living Expenses: Not hard at all   Food Insecurity: No Food Insecurity    Worried About Running Out of Food in the Last Year: Never true    Ran Out of Food in the Last Year: Never true   Transportation Needs: No Transportation Needs    Lack of Transportation (Medical): No    Lack of Transportation (Non-Medical): No   Physical Activity: Sufficiently Active    Days of Exercise per Week: 5 days    Minutes of Exercise  "per Session: 150+ min   Stress: Unknown    Feeling of Stress : Patient refused   Social Connections: Unknown    Frequency of Communication with Friends and Family: More than three times a week    Frequency of Social Gatherings with Friends and Family: Patient refused    Active Member of Clubs or Organizations: Yes    Attends Club or Organization Meetings: More than 4 times per year    Marital Status:    Housing Stability: Unknown    Unable to Pay for Housing in the Last Year: No    Unstable Housing in the Last Year: No     Review of patient's allergies indicates:  No Known Allergies    Review of Systems   Constitutional: Negative for activity change, appetite change, chills, fatigue, fever and unexpected weight change.   HENT: Negative for congestion, ear pain, rhinorrhea, sinus pressure, sinus pain, sneezing, sore throat and trouble swallowing.    Eyes: Positive for visual disturbance. Negative for photophobia, pain and itching.   Respiratory: Negative for cough, chest tightness, shortness of breath and wheezing.    Cardiovascular: Negative for chest pain, palpitations and leg swelling.   Gastrointestinal: Negative for abdominal distention, abdominal pain, constipation, diarrhea, nausea and vomiting.   Endocrine: Negative for cold intolerance, heat intolerance, polydipsia and polyphagia.   Genitourinary: Negative for difficulty urinating, dysuria and frequency.   Musculoskeletal: Negative for arthralgias, joint swelling and myalgias.   Skin: Negative for pallor and rash.   Neurological: Negative for dizziness, seizures, syncope, speech difficulty and headaches.   Hematological: Negative for adenopathy. Does not bruise/bleed easily.   Psychiatric/Behavioral: Negative for agitation, behavioral problems and hallucinations.       Objective:       /76 (BP Location: Left arm, Patient Position: Sitting, BP Method: Large (Manual))   Pulse 74   Temp 98.6 °F (37 °C) (Oral)   Resp 18   Ht 5' 10" " (1.778 m)   Wt 84.4 kg (186 lb)   SpO2 96%   BMI 26.69 kg/m²   Physical Exam  Vitals and nursing note reviewed.   Constitutional:       Appearance: He is well-developed.   HENT:      Head: Normocephalic and atraumatic.      Nose: Nose normal.   Eyes:      Conjunctiva/sclera: Conjunctivae normal.      Pupils: Pupils are equal, round, and reactive to light.   Cardiovascular:      Rate and Rhythm: Normal rate and regular rhythm.      Heart sounds: Normal heart sounds.   Pulmonary:      Effort: Pulmonary effort is normal.      Breath sounds: Normal breath sounds.   Abdominal:      Palpations: Abdomen is soft.      Tenderness: There is no abdominal tenderness.   Musculoskeletal:         General: Normal range of motion.      Cervical back: Normal range of motion and neck supple.   Skin:     General: Skin is warm and dry.   Neurological:      Mental Status: He is alert and oriented to person, place, and time.   Psychiatric:         Behavior: Behavior normal.         Thought Content: Thought content normal.         Judgment: Judgment normal.         Assessment:     1. Type 2 diabetes mellitus without complication, without long-term current use of insulin    2. Mixed hyperlipidemia    3. Screening for malignant neoplasm of prostate    4. Screening for malignant neoplasm of colon    5. On long term drug therapy        Plan:   Type 2 diabetes mellitus without complication, without long-term current use of insulin  -     metFORMIN (GLUCOPHAGE-XR) 500 MG ER 24hr tablet; Take 2 tablets (1,000 mg total) by mouth once daily.  Dispense: 180 tablet; Refill: 1  -     Hemoglobin A1C; Future; Expected date: 08/25/2022  -     POCT Glucose, Hand-Held Device; Future; Expected date: 08/25/2022    Mixed hyperlipidemia  -     atorvastatin (LIPITOR) 40 MG tablet; Take 1 tablet (40 mg total) by mouth every evening.  Dispense: 90 tablet; Refill: 0  -     Comprehensive Metabolic Panel; Future; Expected date: 08/25/2022  -     Lipid Panel;  Future; Expected date: 08/25/2022    Screening for malignant neoplasm of prostate  -     PSA, Screening; Future; Expected date: 08/25/2022    Screening for malignant neoplasm of colon  -     Ambulatory referral/consult to General Surgery; Future; Expected date: 09/01/2022    On long term drug therapy  -     CBC Auto Differential; Future; Expected date: 08/25/2022  -     TSH; Future; Expected date: 08/25/2022  -     T4, Free; Future; Expected date: 08/25/2022      I suspect his weight loss secondary to uncontrolled diabetes.      Resume metformin.      Sample Jardiance 10 mg.  Take 1 tablet p.o. q.a.m.    Rib resume Lipitor.      Labs pending.      Refer for colonoscopy.      Follow-up in 3 weeks.  Sooner if needed    Medication List with Changes/Refills   Changed and/or Refilled Medications    Modified Medication Previous Medication    ATORVASTATIN (LIPITOR) 40 MG TABLET atorvastatin (LIPITOR) 40 MG tablet       Take 1 tablet (40 mg total) by mouth every evening.    Take 1 tablet (40 mg total) by mouth every evening.    METFORMIN (GLUCOPHAGE-XR) 500 MG ER 24HR TABLET metFORMIN (GLUCOPHAGE-XR) 500 MG ER 24hr tablet       Take 2 tablets (1,000 mg total) by mouth once daily.    TAKE TWO TABLETS BY MOUTH DAILY            Disclaimer: This note may have been prepared using voice recognition software, it may have not been extensively proofed, as such there could be errors within the text such as sound alike errors.

## 2022-08-26 LAB
ABS NRBC COUNT: 0 X 10 3/UL (ref 0–0.01)
ABSOLUTE BASOPHIL: 0.04 X 10 3/UL (ref 0–0.22)
ABSOLUTE EOSINOPHIL: 0.11 X 10 3/UL (ref 0.04–0.54)
ABSOLUTE IMMATURE GRAN: 0.01 X 10 3/UL (ref 0–0.04)
ABSOLUTE LYMPHOCYTE: 1.52 X 10 3/UL (ref 0.86–4.75)
ABSOLUTE MONOCYTE: 0.38 X 10 3/UL (ref 0.22–1.08)
ALBUMIN SERPL-MCNC: 4.6 G/DL (ref 3.5–5.2)
ALBUMIN/GLOB SERPL ELPH: 2.3 {RATIO} (ref 1–2.7)
ALP ISOS SERPL LEV INH-CCNC: 51 U/L (ref 40–130)
ALT (SGPT): 31 U/L (ref 0–41)
ANION GAP SERPL CALC-SCNC: 14 MMOL/L (ref 8–17)
AST SERPL-CCNC: 29 U/L (ref 0–40)
BASOPHILS NFR BLD: 1 % (ref 0.2–1.2)
BILIRUBIN, TOTAL: 0.46 MG/DL (ref 0–1.2)
BUN/CREAT SERPL: 16.1 (ref 6–20)
CALCIUM SERPL-MCNC: 9.9 MG/DL (ref 8.6–10.2)
CARBON DIOXIDE, CO2: 23 MMOL/L (ref 22–29)
CHLORIDE: 99 MMOL/L (ref 98–107)
CHOLEST SERPL-MSCNC: 263 MG/DL (ref 100–200)
CREAT SERPL-MCNC: 0.59 MG/DL (ref 0.7–1.2)
EOSINOPHIL NFR BLD: 2.7 % (ref 0.7–7)
ESTIMATED AVERAGE GLUCOSE: 308 MG/DL
GFR ESTIMATION: 137.86
GLOBULIN: 2 G/DL (ref 1.5–4.5)
GLUCOSE: 322 MG/DL (ref 82–115)
HBA1C MFR BLD: 12.3 % (ref 4–6)
HCT VFR BLD AUTO: 44.7 % (ref 42–52)
HDLC SERPL-MCNC: 58 MG/DL
HGB BLD-MCNC: 16.2 G/DL (ref 14–18)
IMMATURE GRANULOCYTES: 0.2 % (ref 0–0.5)
LDL/HDL RATIO: ABNORMAL
LDLC SERPL CALC-MCNC: ABNORMAL MG/DL
LYMPHOCYTES NFR BLD: 37.2 % (ref 19.3–53.1)
MCH RBC QN AUTO: 33.5 PG (ref 27–32)
MCHC RBC AUTO-ENTMCNC: 36.2 G/DL (ref 32–36)
MCV RBC AUTO: 92.4 FL (ref 80–94)
MONOCYTES NFR BLD: 9.3 % (ref 4.7–12.5)
NEUTROPHILS # BLD AUTO: 2.03 X 10 3/UL (ref 2.15–7.56)
NEUTROPHILS NFR BLD: 49.6 % (ref 34–71.1)
NUCLEATED RED BLOOD CELLS: 0 /100 WBC (ref 0–0.2)
PLATELET # BLD AUTO: 144 X 10 3/UL (ref 135–400)
POTASSIUM: 4.1 MMOL/L (ref 3.5–5.1)
PROT SNV-MCNC: 6.6 G/DL (ref 6.4–8.3)
PSA, DIAGNOSTIC: 0.34 NG/ML (ref 0–4)
RBC # BLD AUTO: 4.84 X 10 6/UL (ref 4.7–6.1)
RDW-SD: 42.1 FL (ref 37–54)
SODIUM: 136 MMOL/L (ref 136–145)
T4, FREE: 1.18 NG/DL (ref 0.93–1.7)
TRIGL SERPL-MCNC: 444 MG/DL (ref 0–150)
TSH SERPL DL<=0.005 MIU/L-ACNC: 1.17 UIU/ML (ref 0.27–4.2)
UREA NITROGEN (BUN): 9.5 MG/DL (ref 8–23)
WBC # BLD: 4.09 X 10 3/UL (ref 4.3–10.8)

## 2022-11-03 ENCOUNTER — PATIENT MESSAGE (OUTPATIENT)
Dept: ADMINISTRATIVE | Facility: HOSPITAL | Age: 67
End: 2022-11-03
Payer: COMMERCIAL

## 2022-11-07 ENCOUNTER — PATIENT OUTREACH (OUTPATIENT)
Dept: ADMINISTRATIVE | Facility: HOSPITAL | Age: 67
End: 2022-11-07
Payer: COMMERCIAL

## 2022-11-16 ENCOUNTER — PATIENT MESSAGE (OUTPATIENT)
Dept: ADMINISTRATIVE | Facility: HOSPITAL | Age: 67
End: 2022-11-16
Payer: COMMERCIAL

## 2022-11-29 DIAGNOSIS — E78.2 MIXED HYPERLIPIDEMIA: ICD-10-CM

## 2022-11-29 RX ORDER — ATORVASTATIN CALCIUM 40 MG/1
40 TABLET, FILM COATED ORAL NIGHTLY
Qty: 90 TABLET | Refills: 0 | Status: SHIPPED | OUTPATIENT
Start: 2022-11-29 | End: 2023-11-29

## 2023-03-10 DIAGNOSIS — E11.9 TYPE 2 DIABETES MELLITUS WITHOUT COMPLICATION, WITHOUT LONG-TERM CURRENT USE OF INSULIN: ICD-10-CM

## 2023-03-10 RX ORDER — METFORMIN HYDROCHLORIDE 500 MG/1
1000 TABLET, EXTENDED RELEASE ORAL DAILY
Qty: 60 TABLET | Refills: 0 | Status: SHIPPED | OUTPATIENT
Start: 2023-03-10

## 2023-09-25 ENCOUNTER — PATIENT MESSAGE (OUTPATIENT)
Dept: ADMINISTRATIVE | Facility: HOSPITAL | Age: 68
End: 2023-09-25
Payer: COMMERCIAL